# Patient Record
Sex: MALE | Race: WHITE | NOT HISPANIC OR LATINO | Employment: FULL TIME | URBAN - METROPOLITAN AREA
[De-identification: names, ages, dates, MRNs, and addresses within clinical notes are randomized per-mention and may not be internally consistent; named-entity substitution may affect disease eponyms.]

---

## 2023-02-19 PROBLEM — Z86.59 HISTORY OF OCD (OBSESSIVE COMPULSIVE DISORDER): Status: ACTIVE | Noted: 2023-02-19

## 2023-02-19 PROBLEM — L72.3 SEBACEOUS CYST: Status: ACTIVE | Noted: 2023-02-19

## 2023-02-19 PROBLEM — F32.A DEPRESSION: Status: ACTIVE | Noted: 2023-02-19

## 2023-02-19 PROBLEM — F41.9 ANXIETY: Status: ACTIVE | Noted: 2023-02-19

## 2023-02-19 PROBLEM — F31.81 BIPOLAR 2 DISORDER (MULTI): Status: ACTIVE | Noted: 2023-02-19

## 2023-02-19 RX ORDER — ESCITALOPRAM OXALATE 20 MG/1
TABLET ORAL
COMMUNITY
Start: 2021-04-15 | End: 2023-03-10 | Stop reason: ALTCHOICE

## 2023-02-19 RX ORDER — TURMERIC ROOT EXTRACT 500 MG
TABLET ORAL DAILY
COMMUNITY
Start: 2021-05-11

## 2023-02-19 RX ORDER — QUETIAPINE FUMARATE 400 MG/1
500 TABLET, FILM COATED ORAL NIGHTLY
COMMUNITY
Start: 2020-12-08

## 2023-02-19 RX ORDER — ASPIRIN 325 MG
50000 TABLET, DELAYED RELEASE (ENTERIC COATED) ORAL
COMMUNITY
Start: 2021-05-11

## 2023-03-09 PROBLEM — E55.9 VITAMIN D DEFICIENCY: Status: ACTIVE | Noted: 2023-03-09

## 2023-03-09 ASSESSMENT — ENCOUNTER SYMPTOMS
DIARRHEA: 0
CONSTIPATION: 0
FEVER: 0
EYE REDNESS: 0
ARTHRALGIAS: 0
ABDOMINAL PAIN: 0
MYALGIAS: 0
BRUISES/BLEEDS EASILY: 0
DYSURIA: 0
NERVOUS/ANXIOUS: 0
PALPITATIONS: 0
POLYDIPSIA: 0
DYSPHORIC MOOD: 0
WHEEZING: 0
FATIGUE: 0
CHILLS: 0
EYE DISCHARGE: 0
SORE THROAT: 0
DIZZINESS: 0
FREQUENCY: 0
HEADACHES: 0
COUGH: 0
VOMITING: 0
ADENOPATHY: 0
BLOOD IN STOOL: 0
RHINORRHEA: 0
NAUSEA: 0
HEMATURIA: 0
SHORTNESS OF BREATH: 0
POLYPHAGIA: 0

## 2023-03-10 ENCOUNTER — OFFICE VISIT (OUTPATIENT)
Dept: PRIMARY CARE | Facility: CLINIC | Age: 38
End: 2023-03-10
Payer: COMMERCIAL

## 2023-03-10 VITALS
HEART RATE: 70 BPM | HEIGHT: 72 IN | DIASTOLIC BLOOD PRESSURE: 72 MMHG | TEMPERATURE: 97.2 F | WEIGHT: 189 LBS | BODY MASS INDEX: 25.6 KG/M2 | SYSTOLIC BLOOD PRESSURE: 110 MMHG

## 2023-03-10 DIAGNOSIS — E55.9 VITAMIN D DEFICIENCY: ICD-10-CM

## 2023-03-10 DIAGNOSIS — Z00.00 ROUTINE GENERAL MEDICAL EXAMINATION AT A HEALTH CARE FACILITY: Primary | ICD-10-CM

## 2023-03-10 DIAGNOSIS — Z23 ENCOUNTER FOR VACCINATION: ICD-10-CM

## 2023-03-10 LAB
NON-UH HIE A/G RATIO: 1.4
NON-UH HIE ALB: 4 G/DL (ref 3.4–5)
NON-UH HIE ALK PHOS: 84 UNIT/L (ref 46–116)
NON-UH HIE BASO COUNT: 0.04 X1000 (ref 0–0.2)
NON-UH HIE BASOS %: 0.8 %
NON-UH HIE BILIRUBIN, TOTAL: 0.8 MG/DL (ref 0.2–1)
NON-UH HIE BUN/CREAT RATIO: 17
NON-UH HIE BUN: 17 MG/DL (ref 9–23)
NON-UH HIE CALCIUM: 9.6 MG/DL (ref 8.7–10.4)
NON-UH HIE CALCULATED LDL CHOLESTEROL: 164 MG/DL (ref 60–130)
NON-UH HIE CALCULATED OSMOLALITY: 277 MOSM/KG (ref 275–295)
NON-UH HIE CHLORIDE: 106 MMOL/L (ref 98–107)
NON-UH HIE CHOLESTEROL: 239 MG/DL (ref 100–200)
NON-UH HIE CO2, VENOUS: 30 MMOL/L (ref 20–31)
NON-UH HIE CREATININE: 1 MG/DL (ref 0.6–1.1)
NON-UH HIE DIFF?: NO
NON-UH HIE EOS COUNT: 0.1 X1000 (ref 0–0.5)
NON-UH HIE EOSIN %: 2.1 %
NON-UH HIE GFR AA: >60
NON-UH HIE GLOBULIN: 2.9 G/DL
NON-UH HIE GLOMERULAR FILTRATION RATE: >60 ML/MIN/1.73M?
NON-UH HIE GLUCOSE: 89 MG/DL (ref 74–106)
NON-UH HIE GOT: 28 UNIT/L (ref 15–37)
NON-UH HIE GPT: 59 UNIT/L (ref 10–49)
NON-UH HIE HCT: 47 % (ref 41–52)
NON-UH HIE HDL CHOLESTEROL: 42 MG/DL (ref 40–60)
NON-UH HIE HGB: 15.9 G/DL (ref 13.5–17.5)
NON-UH HIE INSTR WBC: 4.9
NON-UH HIE K: 4.5 MMOL/L (ref 3.5–5.1)
NON-UH HIE LYMPH %: 47.4 %
NON-UH HIE LYMPH COUNT: 2.34 X1000 (ref 1.2–4.8)
NON-UH HIE MCH: 30.4 PG (ref 27–34)
NON-UH HIE MCHC: 33.9 G/DL (ref 32–37)
NON-UH HIE MCV: 89.8 FL (ref 80–100)
NON-UH HIE MONO %: 6.9 %
NON-UH HIE MONO COUNT: 0.34 X1000 (ref 0.1–1)
NON-UH HIE MPV: 8 FL (ref 7.4–10.4)
NON-UH HIE NA: 138 MMOL/L (ref 135–145)
NON-UH HIE NEUTROPHIL %: 42.9 %
NON-UH HIE NEUTROPHIL COUNT (ANC): 2.11 X1000 (ref 1.4–8.8)
NON-UH HIE NUCLEATED RBC: 0 /100WBC
NON-UH HIE PLATELET: 247 X10 (ref 150–450)
NON-UH HIE RBC: 5.23 X10 (ref 4.7–6.1)
NON-UH HIE RDW: 12.6 % (ref 11.5–14.5)
NON-UH HIE TOTAL CHOL/HDL CHOL RATIO: 5.7
NON-UH HIE TOTAL PROTEIN: 6.9 G/DL (ref 5.7–8.2)
NON-UH HIE TRIGLYCERIDES: 167 MG/DL (ref 30–150)
NON-UH HIE TSH: 1.27 UIU/ML (ref 0.55–4.78)
NON-UH HIE VIT D 25: 50 NG/ML
NON-UH HIE WBC: 4.9 X10 (ref 4.5–11)

## 2023-03-10 PROCEDURE — 1036F TOBACCO NON-USER: CPT | Performed by: NURSE PRACTITIONER

## 2023-03-10 PROCEDURE — 90471 IMMUNIZATION ADMIN: CPT | Performed by: NURSE PRACTITIONER

## 2023-03-10 PROCEDURE — 90715 TDAP VACCINE 7 YRS/> IM: CPT | Performed by: NURSE PRACTITIONER

## 2023-03-10 PROCEDURE — 99395 PREV VISIT EST AGE 18-39: CPT | Performed by: NURSE PRACTITIONER

## 2023-03-10 PROCEDURE — 3008F BODY MASS INDEX DOCD: CPT | Performed by: NURSE PRACTITIONER

## 2023-03-10 RX ORDER — SERTRALINE HYDROCHLORIDE 50 MG/1
100 TABLET, FILM COATED ORAL DAILY
COMMUNITY
End: 2024-05-09 | Stop reason: WASHOUT

## 2023-03-10 ASSESSMENT — PATIENT HEALTH QUESTIONNAIRE - PHQ9
SUM OF ALL RESPONSES TO PHQ9 QUESTIONS 1 AND 2: 0
1. LITTLE INTEREST OR PLEASURE IN DOING THINGS: NOT AT ALL
2. FEELING DOWN, DEPRESSED OR HOPELESS: NOT AT ALL

## 2023-03-10 ASSESSMENT — ENCOUNTER SYMPTOMS
DEPRESSION: 0
LOSS OF SENSATION IN FEET: 0
OCCASIONAL FEELINGS OF UNSTEADINESS: 0

## 2023-03-10 NOTE — PATIENT INSTRUCTIONS
Form for work filled out    Handouts given to pt:  physical handout        I recommend signing up for MyChart.    Labs:    You can use the lab in our building when fasting. The hrs are: Monday-Thursday, 7 a.m. - 6 p.m., Friday, 7 a.m. - 5 p.m., Saturday 8 a.m. - 12 noon.   No appt needed, BUT YOU DO NEED THE PAPER ORDER.    Fasting is no food, drink, gum or mints other than water for 12 hrs.   Results will be back in 2-3 business days for most labs. It is always recommended for any orders (labs, xrays, ultrasounds,MRI, ct scan, procedures etc) to check with your insurance provider for expected costs or expenses to you.         You will get your results via phone from my medical assistant if you do not have MyChart.  OR  You will get your results via [employer]    If a result is urgent, I will call to speak to you.    Vaccines:  ---- Tdap-today      General recommendations:  Exercise-cardio 4-5d/wk 30min each day  Diet-Breakfast-toast (my favorite Christy Morgan Delighful Multigrain or Cody's Killer Bread Good Seed thin-sliced)/bagel/English muffin-whole wheat flour as a 1st ingredient or cereal/oatmeal/granola bar-fiber 4g or more or protein like eggs or peanut butter; optional veggies  Lunch-protein, 1/2c carb or 2 slices bread, veg 1c  Dinner-protein, fist sized carb, veg 1c  Fruit 2 a day  Dairy 2 a day-milk, soy milk, almond milk, cheese, yogurt, cottage cheese  Snacks-Protein-hard boiled egg, nuts (walnuts/almonds/pecans/pistachios 1/4c), hummus, beef/deer jerky or meat sticks; vegetable, fruit, dairy-milk(1%, skim, almond, soy)/cheese (not a lot of cheddar)/yogurt (Greek is best-my favorite Dannon Fruit on the Bottom Greek)/cottage cheese 2%; triscuits/ popcorn/wheat thins have a lot of fiber; follow serving size on bag/box/container  increase water  Limit alcohol to 1 drink per day for women and 2 drinks per day for men (1 drink=12oz beer or 5oz wine or 1 1/2oz liquor)  Calcium: 500mg 1 twice a day if age 50 and  younger and 600mg 1 twice a day if over age 50 (calcium citrate can be taken without food)  Vitamin D: 800-5000 IU/day  Limit salt to <2300mg a day if age 50 and under and <1500mg a day if over age 50/have high bp or diabetes or kidney disease  Recommend folate for childbearing age women 0.4mg per day (can be found in a multivitamin)  Recommend 18mg/dL of iron a day if age 50 and under and 8mg/dL a day if over age 50; take on an empty stomach at bedtime  Use sunscreen   Wear seatbelt  Recommend safe sex practices: using condoms everytime you have sex, discuss with a new partner about their past partners/history of STDs/drug use, avoid drinking alcohol or using drugs as this increases the chance that you will participate in high-risk sex, for oral sex help protect your mouth by having your partner use a condom (male or female), women should not douche after sex, be aware of your partner's body and your body-look for signs of a sore, blister, rash, or discharge, and have regular exams and periodic tests for STDs.  No distracted driving  No driving when under influence of substances  Wear a seatbelt  Eye dr every 1-2yrs  Dentist every 6-12 mon  Tetanus shot every 10yrs  Recommend flu vaccine in the fall  Appt in  1 year for physical      I will communicate with you via [email or web address] regarding messages and results. If you need help with this, you can call the support line at [phone].    IT WAS A PLEASURE TO SEE YOU TODAY. THANK YOU FOR CHOOSING US FOR YOUR HEALTHCARE NEEDS.

## 2023-03-10 NOTE — PROGRESS NOTES
Subjective   Patient ID: Amanuel Chan is a 37 y.o. male who presents for Annual Exam (Tdap today.  Fasting for blood work.  No other providers besides his psychiatrist.).  Is pt fasting? yes  Does pt see any providers other than care team below: none  Scott farnsworth-psychiatrist    Would pt like to update his tdap? Yes    Form for work    No care team member to display    HPI  Last labs-3/16/22 cbc nl, tsh nl, cmp nl, trigs 239, hdl 37, ldl 152  Due for labs- all    Cholesterol   Date Value Ref Range Status   03/16/2022 237 (H) 0 - 199 mg/dL Final     Comment:     .      AGE      DESIRABLE   BORDERLINE HIGH   HIGH     0-19 Y     0 - 169       170 - 199     >/= 200    20-24 Y     0 - 189       190 - 224     >/= 225         >24 Y     0 - 199       200 - 239     >/= 240   **All ranges are based on fasting samples. Specific   therapeutic targets will vary based on patient-specific   cardiac risk.  .   Pediatric guidelines reference:Pediatrics 2011, 128(S5).   Adult guidelines reference: NCEP ATPIII Guidelines,     JEANNETTE 2001, 258:2486-97  .   Venipuncture immediately after or during the    administration of Metamizole may lead to falsely   low results. Testing should be performed immediately   prior to Metamizole dosing.     05/11/2021 241 (H) 0 - 199 mg/dL Final     Comment:     .      AGE      DESIRABLE   BORDERLINE HIGH   HIGH     0-19 Y     0 - 169       170 - 199     >/= 200    20-24 Y     0 - 189       190 - 224     >/= 225         >24 Y     0 - 199       200 - 239     >/= 240   **All ranges are based on fasting samples. Specific   therapeutic targets will vary based on patient-specific   cardiac risk.  .   Pediatric guidelines reference:Pediatrics 2011, 128(S5).   Adult guidelines reference: NCEP ATPIII Guidelines,     JEANNETTE 2001, 258:2486-97  .   Venipuncture immediately after or during the    administration of Metamizole may lead to falsely   low results. Testing should be performed immediately   prior to  Metamizole dosing.       Triglycerides   Date Value Ref Range Status   03/16/2022 239 (H) 0 - 149 mg/dL Final     Comment:     .      AGE      DESIRABLE   BORDERLINE HIGH   HIGH     VERY HIGH   0 D-90 D    19 - 174         ----         ----        ----  91 D- 9 Y     0 -  74        75 -  99     >/= 100      ----    10-19 Y     0 -  89        90 - 129     >/= 130      ----    20-24 Y     0 - 114       115 - 149     >/= 150      ----         >24 Y     0 - 149       150 - 199    200- 499    >/= 500  .   Venipuncture immediately after or during the    administration of Metamizole may lead to falsely   low results. Testing should be performed immediately   prior to Metamizole dosing.     05/11/2021 265 (H) 0 - 149 mg/dL Final     Comment:     .      AGE      DESIRABLE   BORDERLINE HIGH   HIGH     VERY HIGH   0 D-90 D    19 - 174         ----         ----        ----  91 D- 9 Y     0 -  74        75 -  99     >/= 100      ----    10-19 Y     0 -  89        90 - 129     >/= 130      ----    20-24 Y     0 - 114       115 - 149     >/= 150      ----         >24 Y     0 - 149       150 - 199    200- 499    >/= 500  .   Venipuncture immediately after or during the    administration of Metamizole may lead to falsely   low results. Testing should be performed immediately   prior to Metamizole dosing.       HDL   Date Value Ref Range Status   03/16/2022 37.5 (A) mg/dL Final     Comment:     .      AGE      VERY LOW   LOW     NORMAL    HIGH       0-19 Y       < 35   < 40     40-45     ----    20-24 Y       ----   < 40       >45     ----      >24 Y       ----   < 40     40-60      >60  .     05/11/2021 37.5 (A) mg/dL Final     Comment:     .      AGE      VERY LOW   LOW     NORMAL    HIGH       0-19 Y       < 35   < 40     40-45     ----    20-24 Y       ----   < 40       >45     ----      >24 Y       ----   < 40     40-60      >60  .       No results found for: LDL  TSH   Date Value Ref Range Status   03/16/2022 2.41 0.44 - 3.98  mIU/L Final     Comment:      TSH testing is performed using different testing    methodology at [facility] than at other    [facility] hospitals. Direct result comparisons should    only be made within the same method.       No components found for: A1C  No components found for: POCA1C  No results found for: ALBUR  No components found for: POCALBUR      Other concerns: none    bps at home- none    ER/urgicare visits in the last year- none  Hospitalizations in the last year- none      FH colon ca-none  FH prostate ca-mgf      Exercise- conductor-work, run, lift wts  Diet-3 meals a day   Body mass index is 25.63 kg/m².        last dental appt- fall 2022    BMs-regular  Sleep-able to fall asleep and stay asleep; no snoring or apnea  no cp, swelling, sob, abd pain, n/v/d/c, blood in stool or black stools  STI testing including hiv (age 15-65) and hep c screening (18-79)-declines        Immunization History   Administered Date(s) Administered    Influenza, seasonal, injectable 10/06/2022       fractures in lifetime-none      FH heart attack, heart surgery-pgf pacemaker, mi  FH stroke-dad, mgm    The ASCVD Risk score (Susanne DK, et al., 2019) failed to calculate for the following reasons:    The 2019 ASCVD risk score is only valid for ages 40 to 79        Review of Systems   Constitutional:  Negative for chills, fatigue and fever.   HENT:  Negative for congestion, ear pain, postnasal drip, rhinorrhea and sore throat.    Eyes:  Negative for discharge, redness and visual disturbance.   Respiratory:  Negative for cough, shortness of breath and wheezing.    Cardiovascular:  Negative for chest pain, palpitations and leg swelling.   Gastrointestinal:  Negative for abdominal pain, blood in stool, constipation, diarrhea, nausea and vomiting.   Endocrine: Negative for cold intolerance, polydipsia, polyphagia and polyuria.   Genitourinary:  Negative for dysuria, frequency, genital sores, hematuria, penile pain,  testicular pain and urgency.   Musculoskeletal:  Negative for arthralgias and myalgias.   Skin:  Negative for rash.   Neurological:  Negative for dizziness, syncope and headaches.   Hematological:  Negative for adenopathy. Does not bruise/bleed easily.   Psychiatric/Behavioral:  Negative for dysphoric mood. The patient is not nervous/anxious.        Objective   Visit Vitals  /72   Pulse 70   Temp 36.2 °C (97.2 °F)      BP Readings from Last 3 Encounters:   03/10/23 110/72   03/16/22 116/74   05/11/21 114/68     Wt Readings from Last 3 Encounters:   03/10/23 85.7 kg (189 lb)   03/16/22 86 kg (189 lb 8 oz)   05/11/21 86.2 kg (190 lb)           Physical Exam  Vitals reviewed.   Constitutional:       General: He is not in acute distress.     Appearance: Normal appearance. He is not ill-appearing.   HENT:      Head: Normocephalic.      Right Ear: Tympanic membrane, ear canal and external ear normal.      Left Ear: Tympanic membrane, ear canal and external ear normal.      Nose: Nose normal.      Mouth/Throat:      Mouth: Mucous membranes are moist.      Pharynx: Oropharynx is clear. No oropharyngeal exudate or posterior oropharyngeal erythema.   Eyes:      Extraocular Movements: Extraocular movements intact.      Conjunctiva/sclera: Conjunctivae normal.      Pupils: Pupils are equal, round, and reactive to light.   Cardiovascular:      Rate and Rhythm: Normal rate and regular rhythm.      Heart sounds: Normal heart sounds. No murmur heard.  Pulmonary:      Effort: Pulmonary effort is normal. No respiratory distress.      Breath sounds: Normal breath sounds. No wheezing, rhonchi or rales.   Abdominal:      General: Bowel sounds are normal. There is no distension.      Palpations: Abdomen is soft. There is no mass.      Tenderness: There is no abdominal tenderness.   Musculoskeletal:         General: No swelling or tenderness. Normal range of motion.      Cervical back: Normal range of motion and neck supple. No  tenderness.      Right lower leg: No edema.      Left lower leg: No edema.   Lymphadenopathy:      Cervical: No cervical adenopathy.   Skin:     General: Skin is warm.      Findings: No rash.   Neurological:      General: No focal deficit present.      Mental Status: He is alert and oriented to person, place, and time.      Cranial Nerves: No cranial nerve deficit.   Psychiatric:         Mood and Affect: Mood normal.         Behavior: Behavior normal.     Assessment/Plan       1. Routine general medical examination at a health care facility    - Comprehensive Metabolic Panel; Future  - CBC and Auto Differential; Future  - Lipid Panel; Future  - TSH with reflex to Free T4 if abnormal; Future  - Comprehensive Metabolic Panel  - CBC and Auto Differential  - Lipid Panel  - TSH with reflex to Free T4 if abnormal    2. Vitamin D deficiency    - Vitamin D 25-Hydroxy,Total; Future  - Vitamin D 25-Hydroxy,Total    3. Encounter for vaccination      4. BMI 25.0-25.9,adult

## 2023-03-12 DIAGNOSIS — R74.8 ELEVATED LIVER ENZYMES: Primary | ICD-10-CM

## 2023-03-12 PROBLEM — E78.00 PURE HYPERCHOLESTEROLEMIA: Status: ACTIVE | Noted: 2023-03-12

## 2023-03-16 ENCOUNTER — OFFICE VISIT (OUTPATIENT)
Dept: PRIMARY CARE | Facility: CLINIC | Age: 38
End: 2023-03-16
Payer: COMMERCIAL

## 2023-03-16 VITALS
HEIGHT: 72 IN | TEMPERATURE: 97.9 F | WEIGHT: 192 LBS | HEART RATE: 80 BPM | BODY MASS INDEX: 26.01 KG/M2 | SYSTOLIC BLOOD PRESSURE: 124 MMHG | DIASTOLIC BLOOD PRESSURE: 72 MMHG

## 2023-03-16 DIAGNOSIS — E78.00 PURE HYPERCHOLESTEROLEMIA: Primary | ICD-10-CM

## 2023-03-16 PROCEDURE — 1036F TOBACCO NON-USER: CPT | Performed by: NURSE PRACTITIONER

## 2023-03-16 PROCEDURE — 99213 OFFICE O/P EST LOW 20 MIN: CPT | Performed by: NURSE PRACTITIONER

## 2023-03-16 PROCEDURE — 3008F BODY MASS INDEX DOCD: CPT | Performed by: NURSE PRACTITIONER

## 2023-03-16 ASSESSMENT — ENCOUNTER SYMPTOMS
SHORTNESS OF BREATH: 0
WHEEZING: 0
CONSTITUTIONAL NEGATIVE: 1

## 2023-03-16 NOTE — PATIENT INSTRUCTIONS
I will contact Dr farnsworth in [place] and a cardio dr to see if seroquel causes the incr risk of cardiovascular dis or is it the elevating of the ldl that causes it.    Goal LDL <100  Goal trigs <150  Goal HDL >50  Exercise-cardio 4-5d/wk 30min each day  Diet-Breakfast-toast (my favorite Christy Morgan Delightful multi-grain and Cody's Killer Bread thin-sliced Good Seed)/bagel/English muffin-whole wheat flour as a 1st ingredient or cereal/oatmeal/granola bar-fiber 4g or more; protein like eggs or peanut butter is Ok  Lunch-protein, veg 1c  Dinner-protein, veg 1c; if having potatoes, rice, noodles, or pasta-->fist sized; could try brown rice or whole wheat pasta or quinoa  Fruit 2 a day  Dairy 2 a day-milk, almond milk, soy milk, yogurt, cottage cheese, yogurt  Snacks-Protein-hard boiled egg, nuts (walnuts/almonds/pecans/pistachios 1/4c), hummus, beef/deer jerky; vegetable, fruit, dairy-milk(1%, skim, almond, soy)/cheese (not a lot of cheddar)/yogurt (Greek is best-my favorite Dannon Fruit on the Bottom Greek)/cottage cheese 2%; triscuits, popcorn have a lot of fiber; serving size  Water  Limit alcohol to 2 drinks per day (1 drink=12oz beer or 5oz wine or 1 1/2oz liquor)  appt in    months; be fasting      I will communicate with you via [employer] regarding messages and results. If you need help with this, you can call the support line at [phone].    IT WAS A PLEASURE TO SEE YOU TODAY. THANK YOU FOR CHOOSING US FOR YOUR HEALTHCARE NEEDS.

## 2023-03-16 NOTE — PROGRESS NOTES
Subjective   Patient ID: Amanuel Chan is a 37 y.o. male who presents for No chief complaint on file..      HPI  Last labs-3/2023 vit d nl, tsh nl, cbc nl, 1 liver enz high at 59 (nl up to 49), sugar nl at 89, trigs 167, ldl 164      Cholesterol   Date Value Ref Range Status   03/16/2022 237 (H) 0 - 199 mg/dL Final     Comment:     .      AGE      DESIRABLE   BORDERLINE HIGH   HIGH     0-19 Y     0 - 169       170 - 199     >/= 200    20-24 Y     0 - 189       190 - 224     >/= 225         >24 Y     0 - 199       200 - 239     >/= 240   **All ranges are based on fasting samples. Specific   therapeutic targets will vary based on patient-specific   cardiac risk.  .   Pediatric guidelines reference:Pediatrics 2011, 128(S5).   Adult guidelines reference: NCEP ATPIII Guidelines,     JEANNETTE 2001, 258:2486-97  .   Venipuncture immediately after or during the    administration of Metamizole may lead to falsely   low results. Testing should be performed immediately   prior to Metamizole dosing.     05/11/2021 241 (H) 0 - 199 mg/dL Final     Comment:     .      AGE      DESIRABLE   BORDERLINE HIGH   HIGH     0-19 Y     0 - 169       170 - 199     >/= 200    20-24 Y     0 - 189       190 - 224     >/= 225         >24 Y     0 - 199       200 - 239     >/= 240   **All ranges are based on fasting samples. Specific   therapeutic targets will vary based on patient-specific   cardiac risk.  .   Pediatric guidelines reference:Pediatrics 2011, 128(S5).   Adult guidelines reference: NCEP ATPIII Guidelines,     JEANNETTE 2001, 258:2486-97  .   Venipuncture immediately after or during the    administration of Metamizole may lead to falsely   low results. Testing should be performed immediately   prior to Metamizole dosing.       Triglycerides   Date Value Ref Range Status   03/16/2022 239 (H) 0 - 149 mg/dL Final     Comment:     .      AGE      DESIRABLE   BORDERLINE HIGH   HIGH     VERY HIGH   0 D-90 D    19 - 174         ----         ----         ----  91 D- 9 Y     0 -  74        75 -  99     >/= 100      ----    10-19 Y     0 -  89        90 - 129     >/= 130      ----    20-24 Y     0 - 114       115 - 149     >/= 150      ----         >24 Y     0 - 149       150 - 199    200- 499    >/= 500  .   Venipuncture immediately after or during the    administration of Metamizole may lead to falsely   low results. Testing should be performed immediately   prior to Metamizole dosing.     05/11/2021 265 (H) 0 - 149 mg/dL Final     Comment:     .      AGE      DESIRABLE   BORDERLINE HIGH   HIGH     VERY HIGH   0 D-90 D    19 - 174         ----         ----        ----  91 D- 9 Y     0 -  74        75 -  99     >/= 100      ----    10-19 Y     0 -  89        90 - 129     >/= 130      ----    20-24 Y     0 - 114       115 - 149     >/= 150      ----         >24 Y     0 - 149       150 - 199    200- 499    >/= 500  .   Venipuncture immediately after or during the    administration of Metamizole may lead to falsely   low results. Testing should be performed immediately   prior to Metamizole dosing.       HDL   Date Value Ref Range Status   03/16/2022 37.5 (A) mg/dL Final     Comment:     .      AGE      VERY LOW   LOW     NORMAL    HIGH       0-19 Y       < 35   < 40     40-45     ----    20-24 Y       ----   < 40       >45     ----      >24 Y       ----   < 40     40-60      >60  .     05/11/2021 37.5 (A) mg/dL Final     Comment:     .      AGE      VERY LOW   LOW     NORMAL    HIGH       0-19 Y       < 35   < 40     40-45     ----    20-24 Y       ----   < 40       >45     ----      >24 Y       ----   < 40     40-60      >60  .       No results found for: LDL  TSH   Date Value Ref Range Status   03/16/2022 2.41 0.44 - 3.98 mIU/L Final     Comment:      TSH testing is performed using different testing    methodology at [facility] than at other    [facility]. Direct result comparisons should    only be made within the same method.       No  components found for: A1C  No components found for: POCA1C  No results found for: ALBUR  No components found for: POCALBUR    Exercise-run, lift wts, conduct orchestra  Diet-breakfast-eggs and toast, granola and yogurt-kefir and berries, coffee with creamer  Lunch-rotisserie ch or healthy choice/lean cuisine; fruit, carrots, water, salad  Dinner-protein, veg, water  Snacks-protein bar  Etoh-rare    Fh chol-unsure  MI/pacemaker- pgf  Dad, mgf stroke    Immunization History   Administered Date(s) Administered    Influenza, seasonal, injectable 10/06/2022    Tdap 03/10/2023        No care team member to display     Review of Systems   Constitutional: Negative.    Respiratory:  Negative for shortness of breath and wheezing.    Cardiovascular:  Negative for chest pain.       Objective   There were no vitals taken for this visit.   BP Readings from Last 3 Encounters:   03/10/23 110/72   03/16/22 116/74   05/11/21 114/68     Wt Readings from Last 3 Encounters:   03/10/23 85.7 kg (189 lb)   03/16/22 86 kg (189 lb 8 oz)   05/11/21 86.2 kg (190 lb)       The ASCVD Risk score (Salcha DK, et al., 2019) failed to calculate for the following reasons:    The 2019 ASCVD risk score is only valid for ages 40 to 79     Physical Exam  Constitutional:       General: He is not in acute distress.     Appearance: Normal appearance.   Neurological:      Mental Status: He is alert.         Assessment/Plan   Diagnoses and all orders for this visit:  Pure hypercholesterolemia

## 2023-03-20 ENCOUNTER — PATIENT MESSAGE (OUTPATIENT)
Dept: PRIMARY CARE | Facility: CLINIC | Age: 38
End: 2023-03-20
Payer: COMMERCIAL

## 2023-03-20 DIAGNOSIS — E78.00 PURE HYPERCHOLESTEROLEMIA: Primary | ICD-10-CM

## 2023-04-10 LAB
NON-UH HIE ALB: 3.9 G/DL (ref 3.4–5)
NON-UH HIE ALK PHOS: 89 UNIT/L (ref 46–116)
NON-UH HIE BILIRUBIN, DIRECT: 0.1 MG/DL (ref 0–0.4)
NON-UH HIE BILIRUBIN, TOTAL: 0.4 MG/DL (ref 0.2–1)
NON-UH HIE GOT: 23 UNIT/L (ref 15–37)
NON-UH HIE GPT: 41 UNIT/L (ref 10–49)
NON-UH HIE TOTAL PROTEIN: 6.8 G/DL (ref 5.7–8.2)

## 2023-04-13 LAB — NON-UH HIE MISC SENDOUT: NORMAL

## 2023-04-15 ENCOUNTER — TELEMEDICINE (OUTPATIENT)
Dept: PRIMARY CARE | Facility: CLINIC | Age: 38
End: 2023-04-15
Payer: COMMERCIAL

## 2023-04-15 DIAGNOSIS — J01.20 SUBACUTE ETHMOIDAL SINUSITIS: Primary | ICD-10-CM

## 2023-04-15 PROCEDURE — 99213 OFFICE O/P EST LOW 20 MIN: CPT | Performed by: STUDENT IN AN ORGANIZED HEALTH CARE EDUCATION/TRAINING PROGRAM

## 2023-04-15 RX ORDER — AMOXICILLIN AND CLAVULANATE POTASSIUM 875; 125 MG/1; MG/1
1 TABLET, FILM COATED ORAL 2 TIMES DAILY
Qty: 20 TABLET | Refills: 0 | Status: SHIPPED | OUTPATIENT
Start: 2023-04-15 | End: 2023-04-25

## 2023-04-15 ASSESSMENT — LIFESTYLE VARIABLES: HISTORY_OF_SMOKING: I SMOKED IN THE PAST, BUT QUIT

## 2023-04-15 ASSESSMENT — ENCOUNTER SYMPTOMS
SHORTNESS OF BREATH: 0
TROUBLE SWALLOWING: 0
SINUS PRESSURE: 1
SINUS PAIN: 1
FEVER: 0

## 2023-04-15 NOTE — PROGRESS NOTES
Patient Name:  Amanuel Chan  MRN:  [id number]  :  1985    Subjective   Patient ID: Amanuel Chan is a 38 y.o. male who presents for No chief complaint on file..    HPI     37 yo male presents with sinus symptoms    Symptoms for 2-3 weeks  Congestion, sinus headaches, post nasal drip  No sick contacts, covid contacts, fevers    Review of Systems   Constitutional:  Negative for fever.   HENT:  Positive for congestion, postnasal drip, sinus pressure and sinus pain. Negative for trouble swallowing.    Respiratory:  Negative for shortness of breath.    Cardiovascular:  Negative for chest pain.   Allergic/Immunologic: Negative for immunocompromised state.     Objective   There were no vitals taken for this visit.    Physical Exam  Constitutional:       Appearance: Normal appearance.   HENT:      Head: Normocephalic and atraumatic.      Comments: Sinus pain and pressure with palpation     Nose: Congestion present.   Eyes:      Extraocular Movements: Extraocular movements intact.   Pulmonary:      Effort: Pulmonary effort is normal.   Neurological:      Mental Status: He is alert and oriented to person, place, and time.         Assessment/Plan   1. Subacute ethmoidal sinusitis  amoxicillin-pot clavulanate (Augmentin) 875-125 mg tablet        Patient has exhibited symptoms for more than 7 days with worsened features and symptoms including one of the clinical practice guidelines to indicate bacterial sinusitis (purulent nasal drainage, unilateral nasal obstruction, facial pain/pressure, and/or anosmia).In patients with rhinosinusitis, antibiotic therapy is recommended if the above criteria are met.   Patient is to be treated with: augmentin  Patient is advised on recommended duration of treatment, allergies are checked with the patient to assure no hx of reaction, patient advised on possible side effects of this medication.   Patient may also use symptomatic relief for this condition including nasal saline  irrigation, intranasal corticosteroids.

## 2023-04-27 ENCOUNTER — PATIENT MESSAGE (OUTPATIENT)
Dept: PRIMARY CARE | Facility: CLINIC | Age: 38
End: 2023-04-27
Payer: COMMERCIAL

## 2023-04-27 DIAGNOSIS — E78.00 PURE HYPERCHOLESTEROLEMIA: Primary | ICD-10-CM

## 2023-04-27 RX ORDER — ROSUVASTATIN CALCIUM 10 MG/1
10 TABLET, COATED ORAL DAILY
Qty: 30 TABLET | Refills: 1 | Status: SHIPPED | OUTPATIENT
Start: 2023-04-27 | End: 2023-06-26

## 2023-05-31 LAB
NON-UH HIE ALB: 3.8 G/DL (ref 3.4–5)
NON-UH HIE ALK PHOS: 81 UNIT/L (ref 46–116)
NON-UH HIE BILIRUBIN, DIRECT: 0.2 MG/DL (ref 0–0.4)
NON-UH HIE BILIRUBIN, TOTAL: 0.7 MG/DL (ref 0.2–1)
NON-UH HIE CALCULATED LDL CHOLESTEROL: 78 MG/DL (ref 60–130)
NON-UH HIE CHOLESTEROL: 153 MG/DL (ref 100–200)
NON-UH HIE GOT: 21 UNIT/L (ref 15–37)
NON-UH HIE GPT: 40 UNIT/L (ref 10–49)
NON-UH HIE HDL CHOLESTEROL: 38 MG/DL (ref 40–60)
NON-UH HIE TOTAL CHOL/HDL CHOL RATIO: 4
NON-UH HIE TOTAL PROTEIN: 6.6 G/DL (ref 5.7–8.2)
NON-UH HIE TRIGLYCERIDES: 185 MG/DL (ref 30–150)

## 2023-06-26 DIAGNOSIS — E78.00 PURE HYPERCHOLESTEROLEMIA: ICD-10-CM

## 2023-06-26 RX ORDER — ROSUVASTATIN CALCIUM 10 MG/1
TABLET, COATED ORAL
Qty: 90 TABLET | Refills: 0 | Status: SHIPPED | OUTPATIENT
Start: 2023-06-26 | End: 2023-09-21

## 2023-09-21 DIAGNOSIS — E78.00 PURE HYPERCHOLESTEROLEMIA: ICD-10-CM

## 2023-09-21 RX ORDER — ROSUVASTATIN CALCIUM 10 MG/1
TABLET, COATED ORAL
Qty: 90 TABLET | Refills: 0 | Status: SHIPPED | OUTPATIENT
Start: 2023-09-21 | End: 2023-12-19

## 2023-12-17 DIAGNOSIS — E78.00 PURE HYPERCHOLESTEROLEMIA: ICD-10-CM

## 2023-12-19 ENCOUNTER — TELEPHONE (OUTPATIENT)
Dept: PRIMARY CARE | Facility: CLINIC | Age: 38
End: 2023-12-19
Payer: COMMERCIAL

## 2023-12-19 RX ORDER — ROSUVASTATIN CALCIUM 10 MG/1
TABLET, COATED ORAL
Qty: 90 TABLET | Refills: 0 | Status: SHIPPED | OUTPATIENT
Start: 2023-12-19 | End: 2024-01-12

## 2024-01-12 DIAGNOSIS — E78.00 PURE HYPERCHOLESTEROLEMIA: ICD-10-CM

## 2024-01-12 RX ORDER — ROSUVASTATIN CALCIUM 10 MG/1
TABLET, COATED ORAL
Qty: 90 TABLET | Refills: 2 | Status: SHIPPED | OUTPATIENT
Start: 2024-01-12 | End: 2024-03-12 | Stop reason: SDUPTHER

## 2024-03-12 ENCOUNTER — OFFICE VISIT (OUTPATIENT)
Dept: PRIMARY CARE | Facility: CLINIC | Age: 39
End: 2024-03-12
Payer: COMMERCIAL

## 2024-03-12 ENCOUNTER — LAB (OUTPATIENT)
Dept: LAB | Facility: LAB | Age: 39
End: 2024-03-12
Payer: COMMERCIAL

## 2024-03-12 VITALS
HEART RATE: 71 BPM | DIASTOLIC BLOOD PRESSURE: 71 MMHG | SYSTOLIC BLOOD PRESSURE: 107 MMHG | OXYGEN SATURATION: 93 % | BODY MASS INDEX: 25.6 KG/M2 | HEIGHT: 72 IN | TEMPERATURE: 97.7 F | WEIGHT: 189 LBS

## 2024-03-12 DIAGNOSIS — Z00.00 ROUTINE GENERAL MEDICAL EXAMINATION AT A HEALTH CARE FACILITY: Primary | ICD-10-CM

## 2024-03-12 DIAGNOSIS — E78.00 PURE HYPERCHOLESTEROLEMIA: ICD-10-CM

## 2024-03-12 DIAGNOSIS — E55.9 VITAMIN D DEFICIENCY: ICD-10-CM

## 2024-03-12 DIAGNOSIS — R39.198 DIFFICULTY URINATING: ICD-10-CM

## 2024-03-12 DIAGNOSIS — Z00.00 ROUTINE GENERAL MEDICAL EXAMINATION AT A HEALTH CARE FACILITY: ICD-10-CM

## 2024-03-12 LAB
25(OH)D3 SERPL-MCNC: 59 NG/ML (ref 30–100)
ALBUMIN SERPL BCP-MCNC: 4.5 G/DL (ref 3.4–5)
ALP SERPL-CCNC: 67 U/L (ref 33–120)
ALT SERPL W P-5'-P-CCNC: 32 U/L (ref 10–52)
ANION GAP SERPL CALC-SCNC: 11 MMOL/L (ref 10–20)
AST SERPL W P-5'-P-CCNC: 16 U/L (ref 9–39)
BASOPHILS # BLD AUTO: 0.04 X10*3/UL (ref 0–0.1)
BASOPHILS NFR BLD AUTO: 0.7 %
BILIRUB SERPL-MCNC: 0.7 MG/DL (ref 0–1.2)
BUN SERPL-MCNC: 20 MG/DL (ref 6–23)
CALCIUM SERPL-MCNC: 9.5 MG/DL (ref 8.6–10.6)
CHLORIDE SERPL-SCNC: 107 MMOL/L (ref 98–107)
CHOLEST SERPL-MCNC: 161 MG/DL (ref 0–199)
CHOLESTEROL/HDL RATIO: 4.1
CO2 SERPL-SCNC: 26 MMOL/L (ref 21–32)
CREAT SERPL-MCNC: 0.92 MG/DL (ref 0.5–1.3)
EGFRCR SERPLBLD CKD-EPI 2021: >90 ML/MIN/1.73M*2
EOSINOPHIL # BLD AUTO: 0.14 X10*3/UL (ref 0–0.7)
EOSINOPHIL NFR BLD AUTO: 2.6 %
ERYTHROCYTE [DISTWIDTH] IN BLOOD BY AUTOMATED COUNT: 12.8 % (ref 11.5–14.5)
GLUCOSE SERPL-MCNC: 86 MG/DL (ref 74–99)
HCT VFR BLD AUTO: 45.5 % (ref 41–52)
HDLC SERPL-MCNC: 39.7 MG/DL
HGB BLD-MCNC: 15.6 G/DL (ref 13.5–17.5)
IMM GRANULOCYTES # BLD AUTO: 0 X10*3/UL (ref 0–0.7)
IMM GRANULOCYTES NFR BLD AUTO: 0 % (ref 0–0.9)
LDLC SERPL CALC-MCNC: 90 MG/DL
LYMPHOCYTES # BLD AUTO: 2.48 X10*3/UL (ref 1.2–4.8)
LYMPHOCYTES NFR BLD AUTO: 45.7 %
MCH RBC QN AUTO: 30.2 PG (ref 26–34)
MCHC RBC AUTO-ENTMCNC: 34.3 G/DL (ref 32–36)
MCV RBC AUTO: 88 FL (ref 80–100)
MONOCYTES # BLD AUTO: 0.33 X10*3/UL (ref 0.1–1)
MONOCYTES NFR BLD AUTO: 6.1 %
NEUTROPHILS # BLD AUTO: 2.44 X10*3/UL (ref 1.2–7.7)
NEUTROPHILS NFR BLD AUTO: 44.9 %
NON HDL CHOLESTEROL: 121 MG/DL (ref 0–149)
NRBC BLD-RTO: 0 /100 WBCS (ref 0–0)
PLATELET # BLD AUTO: 276 X10*3/UL (ref 150–450)
POC APPEARANCE, URINE: CLEAR
POC BILIRUBIN, URINE: NEGATIVE
POC BLOOD, URINE: NEGATIVE
POC COLOR, URINE: YELLOW
POC GLUCOSE, URINE: NEGATIVE MG/DL
POC KETONES, URINE: NEGATIVE MG/DL
POC LEUKOCYTES, URINE: NEGATIVE
POC NITRITE,URINE: NEGATIVE
POC PH, URINE: 6 PH
POC PROTEIN, URINE: ABNORMAL MG/DL
POC SPECIFIC GRAVITY, URINE: >=1.03
POC UROBILINOGEN, URINE: 0.2 EU/DL
POTASSIUM SERPL-SCNC: 4.2 MMOL/L (ref 3.5–5.3)
PROT SERPL-MCNC: 6.8 G/DL (ref 6.4–8.2)
PSA SERPL-MCNC: 3.06 NG/ML
RBC # BLD AUTO: 5.17 X10*6/UL (ref 4.5–5.9)
SODIUM SERPL-SCNC: 140 MMOL/L (ref 136–145)
TRIGL SERPL-MCNC: 156 MG/DL (ref 0–149)
TSH SERPL-ACNC: 3.24 MIU/L (ref 0.44–3.98)
VLDL: 31 MG/DL (ref 0–40)
WBC # BLD AUTO: 5.4 X10*3/UL (ref 4.4–11.3)

## 2024-03-12 PROCEDURE — 85025 COMPLETE CBC W/AUTO DIFF WBC: CPT

## 2024-03-12 PROCEDURE — 87086 URINE CULTURE/COLONY COUNT: CPT

## 2024-03-12 PROCEDURE — 3008F BODY MASS INDEX DOCD: CPT | Performed by: NURSE PRACTITIONER

## 2024-03-12 PROCEDURE — 80053 COMPREHEN METABOLIC PANEL: CPT

## 2024-03-12 PROCEDURE — 82306 VITAMIN D 25 HYDROXY: CPT

## 2024-03-12 PROCEDURE — 81003 URINALYSIS AUTO W/O SCOPE: CPT | Performed by: NURSE PRACTITIONER

## 2024-03-12 PROCEDURE — 84153 ASSAY OF PSA TOTAL: CPT

## 2024-03-12 PROCEDURE — 36415 COLL VENOUS BLD VENIPUNCTURE: CPT

## 2024-03-12 PROCEDURE — 80061 LIPID PANEL: CPT

## 2024-03-12 PROCEDURE — 84443 ASSAY THYROID STIM HORMONE: CPT

## 2024-03-12 PROCEDURE — 1036F TOBACCO NON-USER: CPT | Performed by: NURSE PRACTITIONER

## 2024-03-12 PROCEDURE — 99396 PREV VISIT EST AGE 40-64: CPT | Performed by: NURSE PRACTITIONER

## 2024-03-12 RX ORDER — ROSUVASTATIN CALCIUM 10 MG/1
TABLET, COATED ORAL
Qty: 90 TABLET | Refills: 4 | Status: SHIPPED | OUTPATIENT
Start: 2024-03-12

## 2024-03-12 ASSESSMENT — ENCOUNTER SYMPTOMS
ADENOPATHY: 0
POLYDIPSIA: 0
FATIGUE: 0
DIARRHEA: 0
PALPITATIONS: 0
VOMITING: 0
SHORTNESS OF BREATH: 0
DYSURIA: 0
CHILLS: 0
POLYPHAGIA: 0
CONSTIPATION: 0
BRUISES/BLEEDS EASILY: 0
MYALGIAS: 0
ABDOMINAL PAIN: 0
WHEEZING: 0
DYSPHORIC MOOD: 0
SORE THROAT: 0
EYE REDNESS: 0
RHINORRHEA: 0
HEMATURIA: 0
EYE DISCHARGE: 0
HEADACHES: 0
FEVER: 0
COUGH: 0
ARTHRALGIAS: 0
NERVOUS/ANXIOUS: 0
FREQUENCY: 0
BLOOD IN STOOL: 0
DIZZINESS: 0
NAUSEA: 0

## 2024-03-12 ASSESSMENT — PROMIS GLOBAL HEALTH SCALE
CARRYOUT_PHYSICAL_ACTIVITIES: COMPLETELY
EMOTIONAL_PROBLEMS: RARELY
RATE_MENTAL_HEALTH: VERY GOOD
RATE_QUALITY_OF_LIFE: VERY GOOD
RATE_SOCIAL_SATISFACTION: VERY GOOD
RATE_PHYSICAL_HEALTH: VERY GOOD
RATE_GENERAL_HEALTH: VERY GOOD
CARRYOUT_SOCIAL_ACTIVITIES: VERY GOOD
RATE_AVERAGE_FATIGUE: MODERATE
RATE_AVERAGE_PAIN: 1

## 2024-03-12 NOTE — PATIENT INSTRUCTIONS
See urologist    Med refilled. The number of refills on the meds match when you need to return to the office for an appt. I recommend making your next appt today so you don't run out of your medication as it may take me up to 3 days to refill it.    Handouts given to pt:  physical handout        Labs:    You can use the lab in our building when fasting. The hrs are: Monday-Friday, 7 a.m. - 5 p.m., Saturday 8 a.m. - 12 noon.   No appt needed, BUT YOU DO NEED THE PAPER ORDER.    Fasting is no food, drink, gum or mints other than water for 12 hrs.   Results will be back in 2-3 business days for most labs. It is always recommended for any orders (labs, xrays, ultrasounds,MRI, ct scan, procedures etc) to check with your insurance provider for expected costs or expenses to you.         You will get your results via phone from my medical assistant if you do not have MyChart.  OR  You will get your results via [employer]    If a result is urgent, I will call to speak to you.    Vaccines:  Up to date    General recommendations:  Exercise-cardio 4-5d/wk 30min each day  Diet-Breakfast-toast (my favorite Christy Morgan Delighful Multigrain or [employer] Killer Bread Good Seed thin-sliced)/bagel/English muffin-whole wheat flour as a 1st ingredient or cereal/oatmeal/granola bar-fiber 4g or more or protein like eggs or peanut butter; optional veggies  Lunch-protein, 1/2c carb or 2 slices bread, veg 1c  Dinner-protein, fist sized carb, veg 1c  Fruit 2 a day  Dairy 2 a day-milk, soy milk, almond milk, cheese, yogurt, cottage cheese  Snacks-Protein-hard boiled egg, nuts (walnuts/almonds/pecans/pistachios 1/4c), hummus, beef/deer jerky or meat sticks; vegetable, fruit, dairy-milk(1%, skim, almond, soy)/cheese (not a lot of cheddar)/yogurt (Greek is best-my favorite Dannon Fruit on the Bottom Greek)/cottage cheese 2%; triscuits/ popcorn/wheat thins have a lot of fiber; follow serving size on bag/box/container  increase water  Limit alcohol to 1  drink per day for women and 2 drinks per day for men (1 drink=12oz beer or 5oz wine or 1 1/2oz liquor)  Calcium: 500mg 1 twice a day if age 50 and younger and 600mg 1 twice a day if over age 50 (calcium citrate can be taken without food)  Vitamin D: 800-5000 IU/day  Limit salt to <2300mg a day if age 50 and under and <1500mg a day if over age 50/have high bp or diabetes or kidney disease  Recommend folate for childbearing age women 0.4mg per day (can be found in a multivitamin)  Recommend 18mg/dL of iron a day if age 50 and under and 8mg/dL a day if over age 50; take on an empty stomach at bedtime  Use sunscreen   Wear seatbelt  Recommend safe sex practices: using condoms everytime you have sex, discuss with a new partner about their past partners/history of STDs/drug use, avoid drinking alcohol or using drugs as this increases the chance that you will participate in high-risk sex, for oral sex help protect your mouth by having your partner use a condom (male or female), women should not douche after sex, be aware of your partner's body and your body-look for signs of a sore, blister, rash, or discharge, and have regular exams and periodic tests for STDs.  No distracted driving  No driving when under influence of substances  Wear a seatbelt  Eye dr every 1-2yrs  Dentist every 6-12 mon  Tetanus shot every 10yrs  Recommend flu vaccine in the fall  Appt in  1 year for physical      I will communicate with you via [employer] regarding messages and results. If you need help with this, you can call the support line at [phone].    IT WAS A PLEASURE TO SEE YOU TODAY. THANK YOU FOR CHOOSING US FOR YOUR HEALTHCARE NEEDS.

## 2024-03-12 NOTE — PROGRESS NOTES
Subjective   Patient ID: Amanuel Chan is a 38 y.o. male who presents for Annual Exam.      Is pt fasting? Yes   Does pt see any providers other than care team below:   Isaiah    Any forms to fill out? Yes   Any other questions or concerns that pt wants to discuss today? Prostate check         No care team member to display    HPI  Last labs-5/2023   ldl normal.  Hdl low at 38. Goal >40 for men. Incr exercise.  Trigs high at 185. Goal <150. Decr carbs and sugars.  Liver enzymes normal  3/2023 vit d nl, tsh nl, cbc nl, 1 liver enz high at 59 (nl up to 49), sugar nl at 89, trigs 167, ldl 164   Due for labs- all, D    Cholesterol   Date Value Ref Range Status   03/16/2022 237 (H) 0 - 199 mg/dL Final     Comment:     .      AGE      DESIRABLE   BORDERLINE HIGH   HIGH     0-19 Y     0 - 169       170 - 199     >/= 200    20-24 Y     0 - 189       190 - 224     >/= 225         >24 Y     0 - 199       200 - 239     >/= 240   **All ranges are based on fasting samples. Specific   therapeutic targets will vary based on patient-specific   cardiac risk.  .   Pediatric guidelines reference:Pediatrics 2011, 128(S5).   Adult guidelines reference: NCEP ATPIII Guidelines,     JEANNETTE 2001, 258:2486-97  .   Venipuncture immediately after or during the    administration of Metamizole may lead to falsely   low results. Testing should be performed immediately   prior to Metamizole dosing.     05/11/2021 241 (H) 0 - 199 mg/dL Final     Comment:     .      AGE      DESIRABLE   BORDERLINE HIGH   HIGH     0-19 Y     0 - 169       170 - 199     >/= 200    20-24 Y     0 - 189       190 - 224     >/= 225         >24 Y     0 - 199       200 - 239     >/= 240   **All ranges are based on fasting samples. Specific   therapeutic targets will vary based on patient-specific   cardiac risk.  .   Pediatric guidelines reference:Pediatrics 2011, 128(S5).   Adult guidelines reference: NCEP ATPIII Guidelines,     JEANNETTE 2001, 258:2486-97  .   Venipuncture  immediately after or during the    administration of Metamizole may lead to falsely   low results. Testing should be performed immediately   prior to Metamizole dosing.       Triglycerides   Date Value Ref Range Status   03/16/2022 239 (H) 0 - 149 mg/dL Final     Comment:     .      AGE      DESIRABLE   BORDERLINE HIGH   HIGH     VERY HIGH   0 D-90 D    19 - 174         ----         ----        ----  91 D- 9 Y     0 -  74        75 -  99     >/= 100      ----    10-19 Y     0 -  89        90 - 129     >/= 130      ----    20-24 Y     0 - 114       115 - 149     >/= 150      ----         >24 Y     0 - 149       150 - 199    200- 499    >/= 500  .   Venipuncture immediately after or during the    administration of Metamizole may lead to falsely   low results. Testing should be performed immediately   prior to Metamizole dosing.     05/11/2021 265 (H) 0 - 149 mg/dL Final     Comment:     .      AGE      DESIRABLE   BORDERLINE HIGH   HIGH     VERY HIGH   0 D-90 D    19 - 174         ----         ----        ----  91 D- 9 Y     0 -  74        75 -  99     >/= 100      ----    10-19 Y     0 -  89        90 - 129     >/= 130      ----    20-24 Y     0 - 114       115 - 149     >/= 150      ----         >24 Y     0 - 149       150 - 199    200- 499    >/= 500  .   Venipuncture immediately after or during the    administration of Metamizole may lead to falsely   low results. Testing should be performed immediately   prior to Metamizole dosing.       HDL   Date Value Ref Range Status   03/16/2022 37.5 (A) mg/dL Final     Comment:     .      AGE      VERY LOW   LOW     NORMAL    HIGH       0-19 Y       < 35   < 40     40-45     ----    20-24 Y       ----   < 40       >45     ----      >24 Y       ----   < 40     40-60      >60  .     05/11/2021 37.5 (A) mg/dL Final     Comment:     .      AGE      VERY LOW   LOW     NORMAL    HIGH       0-19 Y       < 35   < 40     40-45     ----    20-24 Y       ----   < 40       >45      "----      >24 Y       ----   < 40     40-60      >60  .       No results found for: \"LDL\"  TSH   Date Value Ref Range Status   03/16/2022 2.41 0.44 - 3.98 mIU/L Final     Comment:      TSH testing is performed using different testing    methodology at [facility] than at other    [facility] hospitals. Direct result comparisons should    only be made within the same method.       No results found for: \"A1C\"  No components found for: \"POCA1C\"  No results found for: \"ALBUR\"  No components found for: \"POCALBUR\"      Other concerns: sometimes hard to start urinating and can't get it all out on off a few yrs; worse the last yr  Ginseng-1000mg a day    bps at home- none    ER/urgicare visits in the last year- none  Hospitalizations in the last year- none      FH colon ca-none  FH prostate ca-mat gf      Exercise- run once a wk and strength at gym once a wk; conductor music, speed walking  Diet-3 meals   Body mass index is 25.63 kg/m².    Had lasik    last dental appt- 2mon ago    BMs-regular  Sleep-able to fall asleep and stay asleep; no snoring or apnea  no cp, swelling, sob, abd pain, n/v/d/c, blood in stool or black stools  STI testing including hiv (age 15-65) and hep c screening (18-79)-declines        Immunization History   Administered Date(s) Administered    Influenza, seasonal, injectable 10/06/2022    Tdap vaccine, age 7 year and older (BOOSTRIX, ADACEL) 03/10/2023         fractures in lifetime-none  Anyone with osteoporosis in the family-none    FH heart attack, heart surgery-pgf pacemaker, mi  FH stroke-dad, mgm    The 10-year ASCVD risk score (Susanne SOLIS, et al., 2019) is: 1.7%    Values used to calculate the score:      Age: 38 years      Sex: Male      Is Non- : No      Diabetic: No      Tobacco smoker: No      Systolic Blood Pressure: 107 mmHg      Is BP treated: No      HDL Cholesterol: 37.5 mg/dL      Total Cholesterol: 237 mg/dL  Coronary Artery Calcium score:  This test " is recommended for men 45 or older and women 55 or older without a history of heart disease and have 1 risk factor (high LDL cholesterol, low HDL cholesterol, high blood pressure, smoker (current or past), type 2 diabetes, IBD, lupus, RA, ankylosing spondylitis, psoriasis or family history of  heart disease <55yrs in dad, brother or child or <65yrs in mom, sister, or child.)       Review of Systems   Constitutional:  Negative for chills, fatigue and fever.   HENT:  Negative for congestion, ear pain, postnasal drip, rhinorrhea and sore throat.    Eyes:  Negative for discharge, redness and visual disturbance.   Respiratory:  Negative for cough, shortness of breath and wheezing.    Cardiovascular:  Negative for chest pain, palpitations and leg swelling.   Gastrointestinal:  Negative for abdominal pain, blood in stool, constipation, diarrhea, nausea and vomiting.   Endocrine: Negative for cold intolerance, polydipsia, polyphagia and polyuria.   Genitourinary:  Negative for dysuria, frequency, genital sores, hematuria, penile pain, testicular pain and urgency.   Musculoskeletal:  Negative for arthralgias and myalgias.   Skin:  Negative for rash.   Neurological:  Negative for dizziness, syncope and headaches.   Hematological:  Negative for adenopathy. Does not bruise/bleed easily.   Psychiatric/Behavioral:  Negative for dysphoric mood. The patient is not nervous/anxious.        Objective   Visit Vitals  /71   Pulse 71   Temp 36.5 °C (97.7 °F)      BP Readings from Last 3 Encounters:   03/12/24 107/71   03/16/23 124/72   03/10/23 110/72     Wt Readings from Last 3 Encounters:   03/12/24 85.7 kg (189 lb)   03/16/23 87.1 kg (192 lb)   03/10/23 85.7 kg (189 lb)           Physical Exam  Vitals reviewed.   Constitutional:       General: He is not in acute distress.     Appearance: Normal appearance. He is not ill-appearing.   HENT:      Head: Normocephalic.      Right Ear: Tympanic membrane, ear canal and external ear  normal.      Left Ear: Tympanic membrane, ear canal and external ear normal.      Nose: Nose normal.      Mouth/Throat:      Mouth: Mucous membranes are moist.      Pharynx: Oropharynx is clear. No oropharyngeal exudate or posterior oropharyngeal erythema.   Eyes:      Extraocular Movements: Extraocular movements intact.      Conjunctiva/sclera: Conjunctivae normal.      Pupils: Pupils are equal, round, and reactive to light.   Cardiovascular:      Rate and Rhythm: Normal rate and regular rhythm.      Heart sounds: Normal heart sounds. No murmur heard.  Pulmonary:      Effort: Pulmonary effort is normal. No respiratory distress.      Breath sounds: Normal breath sounds. No wheezing, rhonchi or rales.   Abdominal:      General: Bowel sounds are normal. There is no distension.      Palpations: Abdomen is soft. There is no mass.      Tenderness: There is no abdominal tenderness.   Musculoskeletal:         General: No swelling or tenderness. Normal range of motion.      Cervical back: Normal range of motion and neck supple. No tenderness.      Right lower leg: No edema.      Left lower leg: No edema.   Lymphadenopathy:      Cervical: No cervical adenopathy.   Skin:     General: Skin is warm.      Findings: No rash.   Neurological:      General: No focal deficit present.      Mental Status: He is alert and oriented to person, place, and time.      Cranial Nerves: No cranial nerve deficit.   Psychiatric:         Mood and Affect: Mood normal.         Behavior: Behavior normal.       Assessment/Plan   Diagnoses and all orders for this visit:  Routine general medical examination at a health care facility  -     Comprehensive Metabolic Panel; Future  -     CBC and Auto Differential; Future  -     Lipid Panel; Future  -     TSH with reflex to Free T4 if abnormal; Future  Vitamin D deficiency  -     Vitamin D 25-Hydroxy,Total (for eval of Vitamin D levels); Future  Pure hypercholesterolemia  -     rosuvastatin (Crestor) 10 mg  tablet; TAKE 1 TABLET BY MOUTH EVERY DAY  Difficulty urinating  -     Urine Culture; Future  -     POCT UA Automated manually resulted; Future  -     Referral to Urology; Future  -     Prostate Specific Antigen; Future  BMI 25.0-25.9,adult  Other orders  -     Follow Up In Primary Care - Health Maintenance; Future

## 2024-03-13 LAB — BACTERIA UR CULT: NORMAL

## 2024-03-28 ENCOUNTER — PATIENT MESSAGE (OUTPATIENT)
Dept: PRIMARY CARE | Facility: CLINIC | Age: 39
End: 2024-03-28
Payer: COMMERCIAL

## 2024-03-28 DIAGNOSIS — M54.2 NECK PAIN: Primary | ICD-10-CM

## 2024-03-28 RX ORDER — ORPHENADRINE CITRATE 100 MG/1
100 TABLET, EXTENDED RELEASE ORAL 2 TIMES DAILY PRN
Qty: 60 TABLET | Refills: 5 | Status: SHIPPED | OUTPATIENT
Start: 2024-03-28 | End: 2024-09-24

## 2024-05-09 ENCOUNTER — OFFICE VISIT (OUTPATIENT)
Dept: UROLOGY | Facility: CLINIC | Age: 39
End: 2024-05-09
Payer: COMMERCIAL

## 2024-05-09 VITALS
BODY MASS INDEX: 25.73 KG/M2 | DIASTOLIC BLOOD PRESSURE: 86 MMHG | SYSTOLIC BLOOD PRESSURE: 129 MMHG | HEIGHT: 72 IN | WEIGHT: 190 LBS | HEART RATE: 87 BPM

## 2024-05-09 DIAGNOSIS — Z87.438 HISTORY OF TESTICULAR LUMP: ICD-10-CM

## 2024-05-09 DIAGNOSIS — N40.1 BENIGN PROSTATIC HYPERPLASIA WITH URINARY HESITANCY: ICD-10-CM

## 2024-05-09 DIAGNOSIS — R39.198 DIFFICULTY URINATING: Primary | ICD-10-CM

## 2024-05-09 DIAGNOSIS — R93.89 ABNORMAL GENITOURINARY ULTRASOUND: ICD-10-CM

## 2024-05-09 DIAGNOSIS — N52.9 ERECTILE DYSFUNCTION, UNSPECIFIED ERECTILE DYSFUNCTION TYPE: ICD-10-CM

## 2024-05-09 DIAGNOSIS — R39.11 BENIGN PROSTATIC HYPERPLASIA WITH URINARY HESITANCY: ICD-10-CM

## 2024-05-09 LAB
POC APPEARANCE, URINE: CLEAR
POC BILIRUBIN, URINE: NEGATIVE
POC BLOOD, URINE: ABNORMAL
POC COLOR, URINE: YELLOW
POC GLUCOSE, URINE: NEGATIVE MG/DL
POC KETONES, URINE: NEGATIVE MG/DL
POC LEUKOCYTES, URINE: NEGATIVE
POC NITRITE,URINE: NEGATIVE
POC PH, URINE: 6.5 PH
POC PROTEIN, URINE: NEGATIVE MG/DL
POC SPECIFIC GRAVITY, URINE: 1.01
POC UROBILINOGEN, URINE: 0.2 EU/DL

## 2024-05-09 PROCEDURE — 81003 URINALYSIS AUTO W/O SCOPE: CPT

## 2024-05-09 PROCEDURE — 3008F BODY MASS INDEX DOCD: CPT | Performed by: NURSE PRACTITIONER

## 2024-05-09 PROCEDURE — 51798 US URINE CAPACITY MEASURE: CPT | Performed by: NURSE PRACTITIONER

## 2024-05-09 PROCEDURE — 1036F TOBACCO NON-USER: CPT | Performed by: NURSE PRACTITIONER

## 2024-05-09 PROCEDURE — 99204 OFFICE O/P NEW MOD 45 MIN: CPT | Performed by: NURSE PRACTITIONER

## 2024-05-09 PROCEDURE — 87086 URINE CULTURE/COLONY COUNT: CPT

## 2024-05-09 PROCEDURE — 81003 URINALYSIS AUTO W/O SCOPE: CPT | Performed by: NURSE PRACTITIONER

## 2024-05-09 RX ORDER — SERTRALINE HYDROCHLORIDE 100 MG/1
100 TABLET, FILM COATED ORAL DAILY
COMMUNITY
Start: 2024-04-08

## 2024-05-09 RX ORDER — TADALAFIL 10 MG/1
10 TABLET ORAL DAILY PRN
Qty: 30 TABLET | Refills: 1 | Status: SHIPPED | OUTPATIENT
Start: 2024-05-09 | End: 2024-07-08

## 2024-05-09 RX ORDER — TAMSULOSIN HYDROCHLORIDE 0.4 MG/1
0.4 CAPSULE ORAL DAILY
Qty: 90 CAPSULE | Refills: 3 | Status: SHIPPED | OUTPATIENT
Start: 2024-05-09 | End: 2025-05-09

## 2024-05-09 RX ORDER — ACETAMINOPHEN 500 MG
5000 TABLET ORAL DAILY
COMMUNITY

## 2024-05-09 NOTE — PROGRESS NOTES
05/09/24   [id number]    Chief Complaint   Patient presents with    Difficulty Urinating    urinary hesitancy   Follow up left testicle lump, weaker erection, some urine leakage      Subjective      HPI Amanuel Chan is a 39 y.o. male who presents for urinary hesitancy, difficulty urinating, left testicle lump follow up, weaker erections and some urine leakage;     Kindly referred by Deborah Piper, CNP    Weak stream for a while gradually worsening; used to do penis enlargement exercises and pills through [employer], wondered if related; difficulty w stream day and night, feels like he doesn't empty; has to milk out urine from behind scrotum; dribble after urination better since using technique; no urgency;     Hasn't felt left testicle lump or bothered him lately, but not really monitoring; wife had noticed it back few years ago;     Erections more of an issues since 2017 when started psychiatric medications, most of the time can get erection but maybe not as hard, would block vein to help w erection;     No hematuria, occasional UTI;   DTF 5-6 x, NTF 0 x    PVR 0 ml, UA trace heme today    PSA 3.06 on 3/12/24  Creatinine 0.92 on 3/12/24    US scrotum 5/21/21: 1. Bilateral testicular calcifications without any surrounding mass; lesion could be sequelae of prior inflammation but cannot definitely  differentiate from burnt out germ-cell tumor. Recommend continued  attention to this on follow-up imaging  2. Small left-sided varicocele.         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 in shoulders;   PSH lasik  FH prostate Maternal grandfather  (70s)   , never smoked    KATELIN 20  AUABPH 19    Objective     /86   Pulse 87   Ht 1.829 m (6')   Wt 86.2 kg (190 lb)   BMI 25.77 kg/m²    Physical Exam  Genitourinary:     Comments: Scrotum: no lesions, no cysts, no rashes  Epididymides: normal size, position, symmetric, no masses or  induration  Testis: normal size, position, symmetric, no masses, no hydroceles, nontender  Urethra and meatus: normal size, position, no lesions or discharge  Penis: normal (circumcised) phallus, no palpable plaque, no lesions/masses/deformity  Prostate: nontender, symmetric, left center abnormal? Difficult exam d/t stool  Sphincter: normal tone, brown stool in rectal vault, no hemorrhoids or masses           General: Appears comfortable and in no apparent distress, well nourished  Head: Normocephalic, atraumatic  Neck: trachea midline  Respiratory: respirations unlabored, no wheezes, and no use of accessory muscles  Cardiovascular: at rest no dyspnea, well perfused  Skin: no visible rashes or lesions  Neurologic: grossly intact, oriented to person, place, and time  Psychiatric: mood and affect appropriate  Musculoskeletal: in chair for appt. no difficulty w upper body movement      Assessment/Plan   Problem List Items Addressed This Visit    None  Visit Diagnoses       Difficulty urinating              No orders of the defined types were placed in this encounter.     Cystoscopy w Dr. Paz, difficulty urinating, repeat CALVIN center prostate sl abnormal?  Difficult exam w stool in rectum;    Urine culture 2 weeks prior    Tamsulosin 0.4 mg at bedtime discussed risk/benefits    Cialis 10 mg prior to intimacy for ED    Discussed cialis low dose as well but will start w this plan    Scrotal US to follow left testicle lump noted in 2021 possible germ tumor on US  Call [phone] to schedule the ultrasound           KRISTIN Reddy-CNP  Lab Results   Component Value Date    GLUCOSE 86 03/12/2024    CALCIUM 9.5 03/12/2024     03/12/2024    K 4.2 03/12/2024    CO2 26 03/12/2024     03/12/2024    BUN 20 03/12/2024    CREATININE 0.92 03/12/2024

## 2024-05-09 NOTE — PATIENT INSTRUCTIONS
Cystoscopy w Dr. Paz, difficulty urinating  Urine culture 2 weeks prior  Tamsulosin 0.4 mg at bedtime discussed risk/benefits  Cialis 10 mg prior to intimacy for ED  Discussed cialis low dose as well but will start w this plan    Scrotal US to follow left testicle lump noted in 2021 possible germ tumor on US  Call [phone] to schedule the ultrasound

## 2024-05-10 LAB
APPEARANCE UR: CLEAR
BILIRUB UR STRIP.AUTO-MCNC: NEGATIVE MG/DL
COLOR UR: NORMAL
GLUCOSE UR STRIP.AUTO-MCNC: NORMAL MG/DL
KETONES UR STRIP.AUTO-MCNC: NEGATIVE MG/DL
LEUKOCYTE ESTERASE UR QL STRIP.AUTO: NEGATIVE
NITRITE UR QL STRIP.AUTO: NEGATIVE
PH UR STRIP.AUTO: 6.5 [PH]
PROT UR STRIP.AUTO-MCNC: NEGATIVE MG/DL
RBC # UR STRIP.AUTO: NEGATIVE /UL
SP GR UR STRIP.AUTO: 1.01
UROBILINOGEN UR STRIP.AUTO-MCNC: NORMAL MG/DL

## 2024-05-11 LAB — BACTERIA UR CULT: NO GROWTH

## 2024-05-13 ENCOUNTER — HOSPITAL ENCOUNTER (OUTPATIENT)
Dept: RADIOLOGY | Facility: CLINIC | Age: 39
Discharge: HOME | End: 2024-05-13
Payer: COMMERCIAL

## 2024-05-13 DIAGNOSIS — Z87.438 HISTORY OF TESTICULAR LUMP: ICD-10-CM

## 2024-05-13 DIAGNOSIS — R93.89 ABNORMAL GENITOURINARY ULTRASOUND: ICD-10-CM

## 2024-05-13 PROCEDURE — 76870 US EXAM SCROTUM: CPT

## 2024-05-13 PROCEDURE — 76870 US EXAM SCROTUM: CPT | Performed by: STUDENT IN AN ORGANIZED HEALTH CARE EDUCATION/TRAINING PROGRAM

## 2024-05-14 ENCOUNTER — TELEPHONE (OUTPATIENT)
Dept: UROLOGY | Facility: CLINIC | Age: 39
End: 2024-05-14
Payer: COMMERCIAL

## 2024-05-14 NOTE — TELEPHONE ENCOUNTER
----- Message from ЕЛЕНА Reddy sent at 5/13/2024  8:06 AM EDT -----  No hematuria, no infection  ----- Message -----  From: Britney Vargas MA  Sent: 5/9/2024   9:18 AM EDT  To: ЕЛЕНА Reddy

## 2024-06-07 DIAGNOSIS — R30.0 DIFFICULT OR PAINFUL URINATION: Primary | ICD-10-CM

## 2024-06-07 LAB
NON-UH HIE APPEARANCE, U: CLEAR
NON-UH HIE BILIRUBIN, U: NEGATIVE
NON-UH HIE BLOOD, U: NEGATIVE
NON-UH HIE COLOR, U: YELLOW
NON-UH HIE GLUCOSE QUAL, U: NEGATIVE
NON-UH HIE KETONES, U: NEGATIVE
NON-UH HIE LEUKOCYTE ESTERASE, U: NEGATIVE
NON-UH HIE NITRITE, U: NEGATIVE
NON-UH HIE PH, U: 5.5 (ref 4.5–8)
NON-UH HIE PROTEIN, U: NEGATIVE
NON-UH HIE SPECIFIC GRAVITY, U: 1.02 (ref 1–1.03)
NON-UH HIE U MICRO: NORMAL
NON-UH HIE UROBILINOGEN QUAL, U: NORMAL

## 2024-06-13 DIAGNOSIS — N52.9 ERECTILE DYSFUNCTION, UNSPECIFIED ERECTILE DYSFUNCTION TYPE: Primary | ICD-10-CM

## 2024-06-13 RX ORDER — TADALAFIL 5 MG/1
TABLET ORAL
Qty: 30 TABLET | Refills: 2 | Status: SHIPPED | OUTPATIENT
Start: 2024-06-13

## 2024-06-21 ENCOUNTER — APPOINTMENT (OUTPATIENT)
Dept: UROLOGY | Facility: HOSPITAL | Age: 39
End: 2024-06-21
Payer: COMMERCIAL

## 2024-06-21 DIAGNOSIS — R97.20 ELEVATED PSA: ICD-10-CM

## 2024-06-21 DIAGNOSIS — R39.12 WEAK URINARY STREAM: ICD-10-CM

## 2024-06-21 DIAGNOSIS — R39.11 URINARY HESITANCY: Primary | ICD-10-CM

## 2024-06-21 DIAGNOSIS — R39.198 DIFFICULTY URINATING: ICD-10-CM

## 2024-06-21 PROCEDURE — 99213 OFFICE O/P EST LOW 20 MIN: CPT | Performed by: UROLOGY

## 2024-06-21 PROCEDURE — 52000 CYSTOURETHROSCOPY: CPT | Performed by: UROLOGY

## 2024-06-21 NOTE — PROGRESS NOTES
HPI    39 y.o. male being seen with the following problem list:    Problem list:  urinary hesitancy, difficulty urinating     Referred by Maria T Logan for cystoscopy.  Also with question of left testicular lump, though recent scrotal ultrasound was normal.  Was started on Flomax by Maria T.    06/21/24 -seen for cystoscopy.  Notes improvement on Flomax.  Still not perfect but still not perfect.  No history of catheterization.  No history of STDs.  No history of urethral trauma      Lab Results   Component Value Date    PSA 3.06 03/12/2024         Current Medications:  Current Outpatient Medications   Medication Sig Dispense Refill    cholecalciferol (Vitamin D-3) 1,250 mcg (50,000 unit) capsule Take 1 capsule (50,000 Units) by mouth 1 (one) time per week.      cholecalciferol (Vitamin D3) 5,000 Units tablet Take 1 tablet (5,000 Units) by mouth once daily.      GINSENG ORAL GINSENG 1000MG      multivit-min/folic/vit K/lycop (MEN'S MULTIVITAMIN ORAL) daily      NON FORMULARY Elderberry Zinc/Vit C/Immune Mouth/Throat Lozenge      omega-3/dha/epa/fish oil (OMEGA-3 ORAL) Take 1,000 mg by mouth once daily.      orphenadrine (Norflex) 100 mg 12 hr tablet Take 1 tablet (100 mg) by mouth 2 times a day as needed for muscle spasms or mild pain (1 - 3). Do not crush, chew, or split. 60 tablet 5    QUEtiapine (SEROquel) 400 mg tablet Take 500 mg by mouth once daily at bedtime.      rosuvastatin (Crestor) 10 mg tablet TAKE 1 TABLET BY MOUTH EVERY DAY 90 tablet 4    sertraline (Zoloft) 100 mg tablet Take 1 tablet (100 mg) by mouth once daily.      tadalafil (Cialis) 5 mg tablet Take one tablet an hour before intimacy 30 tablet 2    tamsulosin (Flomax) 0.4 mg 24 hr capsule Take 1 capsule (0.4 mg) by mouth once daily. 90 capsule 3    turmeric root extract 500 mg tablet once daily.       No current facility-administered medications for this visit.        Active Problems:  Amanuel Chan is a 39 y.o. male with the following Problems and  Medications.  Patient Active Problem List   Diagnosis    Anxiety    Bipolar 2 disorder (Multi)    Depression    History of OCD (obsessive compulsive disorder)    Sebaceous cyst    Vitamin D deficiency    Routine general medical examination at a health care facility    Encounter for vaccination    BMI 25.0-25.9,adult    Pure hypercholesterolemia    Elevated liver enzymes     Current Outpatient Medications   Medication Sig Dispense Refill    cholecalciferol (Vitamin D-3) 1,250 mcg (50,000 unit) capsule Take 1 capsule (50,000 Units) by mouth 1 (one) time per week.      cholecalciferol (Vitamin D3) 5,000 Units tablet Take 1 tablet (5,000 Units) by mouth once daily.      GINSENG ORAL GINSENG 1000MG      multivit-min/folic/vit K/lycop (MEN'S MULTIVITAMIN ORAL) daily      NON FORMULARY Elderberry Zinc/Vit C/Immune Mouth/Throat Lozenge      omega-3/dha/epa/fish oil (OMEGA-3 ORAL) Take 1,000 mg by mouth once daily.      orphenadrine (Norflex) 100 mg 12 hr tablet Take 1 tablet (100 mg) by mouth 2 times a day as needed for muscle spasms or mild pain (1 - 3). Do not crush, chew, or split. 60 tablet 5    QUEtiapine (SEROquel) 400 mg tablet Take 500 mg by mouth once daily at bedtime.      rosuvastatin (Crestor) 10 mg tablet TAKE 1 TABLET BY MOUTH EVERY DAY 90 tablet 4    sertraline (Zoloft) 100 mg tablet Take 1 tablet (100 mg) by mouth once daily.      tadalafil (Cialis) 5 mg tablet Take one tablet an hour before intimacy 30 tablet 2    tamsulosin (Flomax) 0.4 mg 24 hr capsule Take 1 capsule (0.4 mg) by mouth once daily. 90 capsule 3    turmeric root extract 500 mg tablet once daily.       No current facility-administered medications for this visit.       PMH:  Past Medical History:   Diagnosis Date    Contact with and (suspected) exposure to other viral communicable diseases     Mono exposure    Encounter for immunization     Encounter for immunization    Mononeuropathy, unspecified     Pinched nerve    Personal history of other  infectious and parasitic diseases     History of varicella       PSH:  Past Surgical History:   Procedure Laterality Date    OTHER SURGICAL HISTORY  05/11/2021    Corneal lasik       FMH:  Family History   Problem Relation Name Age of Onset    Depression Father      Depression Father's Brother      Other (Cerebrovascular Accident) Maternal Grandmother      Depression Paternal Grandfather      Hypertension Paternal Grandfather      Prostate cancer Paternal Grandfather      Heart attack Paternal Grandfather      Other (H/o heart bypass surgery) Paternal Grandfather      Other (Cardiac Disorder) Other Grandfather     Cancer Other Grandfather     Depression Paternal Great-Grandmother      Depression Paternal Great-Grandfather         SHx:  Social History     Tobacco Use    Smoking status: Never    Smokeless tobacco: Never   Substance Use Topics    Alcohol use: Yes    Drug use: Never     Comment: h/o drug abuse       Allergies:  No Known Allergies    Procedure:  After informed consent was obtained, the patient was taken to the procedure room for cystoscopy due to weak stream, urinary hesitancy.     Cystoscopy     Procedure Note:    A sterile prep and drape was performed in standard fashion. Lidocaine was used for topical anesthesia. A flexible cystoscope was inserted into the urethra without difficulty revealing normal urethra.     The prostate was bilobar, nonobstructive.    Then entered the bladder revealing bladder mucosa with no erythematous patches or plaques, foreign bodies, stones or papillary lesions. The ureteral orifices were visualized bilaterally. These were orthotopic in location and effluxing clear urine. No masses were seen on retroflexion.     Post-Procedure:   The cystoscope was removed. The vital signs were stable . The patient tolerated the procedure well. There were no complications.               Assessment/Plan  Cystoscopy completely normal.  He has been on Flomax, also recommended pelvic floor  physical therapy which he would like to try.  PSA was higher than would be expected from his age, would recheck in 3 months.  I will see him in 3 months over telehealth PSA prior.  Will continue seeing an for his urinary issues.        Scribe Attestation  By signing my name below, I, Joseph Cook, attest that this documentation  has been prepared under the direction and in the presence of Margarito Paz MD.

## 2024-08-25 ASSESSMENT — DERMATOLOGY QUALITY OF LIFE (QOL) ASSESSMENT
DATE THE QUALITY-OF-LIFE ASSESSMENT WAS COMPLETED: 67077
RATE HOW BOTHERED YOU ARE BY EFFECTS OF YOUR SKIN PROBLEMS ON YOUR ACTIVITIES (EG, GOING OUT, ACCOMPLISHING WHAT YOU WANT, WORK ACTIVITIES OR YOUR RELATIONSHIPS WITH OTHERS): 0 - NEVER BOTHERED
RATE HOW EMOTIONALLY BOTHERED YOU ARE BY YOUR SKIN PROBLEM (FOR EXAMPLE, WORRY, EMBARRASSMENT, FRUSTRATION): 0 - NEVER BOTHERED
WHAT SINGLE SKIN CONDITION LISTED BELOW IS THE PATIENT ANSWERING THE QUALITY-OF-LIFE ASSESSMENT QUESTIONS ABOUT: NONE OF THE ABOVE
RATE HOW BOTHERED YOU ARE BY SYMPTOMS OF YOUR SKIN PROBLEM (EG, ITCHING, STINGING BURNING, HURTING OR SKIN IRRITATION): 1
WHAT SINGLE SKIN CONDITION LISTED BELOW IS THE PATIENT ANSWERING THE QUALITY-OF-LIFE ASSESSMENT QUESTIONS ABOUT: NONE OF THE ABOVE
RATE HOW EMOTIONALLY BOTHERED YOU ARE BY YOUR SKIN PROBLEM (FOR EXAMPLE, WORRY, EMBARRASSMENT, FRUSTRATION): 0 - NEVER BOTHERED
RATE HOW BOTHERED YOU ARE BY EFFECTS OF YOUR SKIN PROBLEMS ON YOUR ACTIVITIES (EG, GOING OUT, ACCOMPLISHING WHAT YOU WANT, WORK ACTIVITIES OR YOUR RELATIONSHIPS WITH OTHERS): 0 - NEVER BOTHERED
RATE HOW BOTHERED YOU ARE BY SYMPTOMS OF YOUR SKIN PROBLEM (EG, ITCHING, STINGING BURNING, HURTING OR SKIN IRRITATION): 1

## 2024-08-25 ASSESSMENT — PATIENT GLOBAL ASSESSMENT (PGA): WHAT IS THE PGA: PATIENT GLOBAL ASSESSMENT:  1 - CLEAR

## 2024-08-26 ENCOUNTER — APPOINTMENT (OUTPATIENT)
Dept: DERMATOLOGY | Facility: CLINIC | Age: 39
End: 2024-08-26
Payer: COMMERCIAL

## 2024-08-26 DIAGNOSIS — L72.11 PILAR CYST: Primary | ICD-10-CM

## 2024-08-26 DIAGNOSIS — L72.3 SEBACEOUS CYST: ICD-10-CM

## 2024-08-26 PROCEDURE — 99203 OFFICE O/P NEW LOW 30 MIN: CPT | Performed by: NURSE PRACTITIONER

## 2024-08-26 PROCEDURE — 1036F TOBACCO NON-USER: CPT | Performed by: NURSE PRACTITIONER

## 2024-08-26 NOTE — PROGRESS NOTES
Subjective     Amanuel Chan is a 39 y.o. male who presents for the following: Rash.   New patient in for 25 counted by wife lesions all over body present for 10 years no prior treatment.     Review of Systems:  No other skin or systemic complaints other than what is documented elsewhere in the note.    The following portions of the chart were reviewed this encounter and updated as appropriate:       Skin Cancer History  No skin cancer on file.    Specialty Problems          Dermatology Problems    Sebaceous cyst     Past Medical History:  Amanuel Chan  has a past medical history of Contact with and (suspected) exposure to other viral communicable diseases, Encounter for immunization, Mononeuropathy, unspecified, and Personal history of other infectious and parasitic diseases.    Past Surgical History:  Amanuel Chan  has a past surgical history that includes Other surgical history (05/11/2021).    Family History:  Patient family history includes Cancer in an other family member; Cardiac Disorder in an other family member; Cerebrovascular Accident in his maternal grandmother; Depression in his father, father's brother, paternal grandfather, paternal great-grandfather, and paternal great-grandmother; H/o heart bypass surgery in his paternal grandfather; Heart attack in his paternal grandfather; Hypertension in his paternal grandfather; Prostate cancer in his paternal grandfather.    Social History:  Amanuel Chan  reports that he has never smoked. He has never used smokeless tobacco. He reports current alcohol use. He reports that he does not use drugs.    Allergies:  Patient has no known allergies.    Current Medications / CAM's:    Current Outpatient Medications:     cholecalciferol (Vitamin D-3) 1,250 mcg (50,000 unit) capsule, Take 1 capsule (50,000 Units) by mouth 1 (one) time per week., Disp: , Rfl:     cholecalciferol (Vitamin D3) 5,000 Units tablet, Take 1 tablet (5,000 Units) by mouth once daily.,  "Disp: , Rfl:     GINSENG ORAL, GINSENG 1000MG, Disp: , Rfl:     multivit-min/folic/vit K/lycop (MEN'S MULTIVITAMIN ORAL), daily, Disp: , Rfl:     NON FORMULARY, Elderberry Zinc/Vit C/Immune Mouth/Throat Lozenge, Disp: , Rfl:     omega-3/dha/epa/fish oil (OMEGA-3 ORAL), Take 1,000 mg by mouth once daily., Disp: , Rfl:     orphenadrine (Norflex) 100 mg 12 hr tablet, Take 1 tablet (100 mg) by mouth 2 times a day as needed for muscle spasms or mild pain (1 - 3). Do not crush, chew, or split., Disp: 60 tablet, Rfl: 5    QUEtiapine (SEROquel) 400 mg tablet, Take 500 mg by mouth once daily at bedtime., Disp: , Rfl:     rosuvastatin (Crestor) 10 mg tablet, TAKE 1 TABLET BY MOUTH EVERY DAY, Disp: 90 tablet, Rfl: 4    sertraline (Zoloft) 100 mg tablet, Take 1 tablet (100 mg) by mouth once daily., Disp: , Rfl:     tadalafil (Cialis) 5 mg tablet, Take one tablet an hour before intimacy, Disp: 30 tablet, Rfl: 2    tamsulosin (Flomax) 0.4 mg 24 hr capsule, Take 1 capsule (0.4 mg) by mouth once daily., Disp: 90 capsule, Rfl: 3    turmeric root extract 500 mg tablet, once daily., Disp: , Rfl:      Objective   Well appearing patient in no apparent distress; mood and affect are within normal limits.      Assessment/Plan   1. Pilar cyst  Scalp    2. Sebaceous cyst (2)  Left Forearm - Anterior, Right Buttock  2-3 cm round, nontender, mobile nodules that are asymptomatic in nature.  Patient states he wife has found \"25\" of them on his body, but only three of them examined today.      Ddx includes lipoma     PLAN:  No treatment indicated  Reassurance there are no lesions concerning for underlying cancer today          "

## 2024-09-19 LAB — NON-UH HIE PROSTATIC SPECIFIC ANTIGEN: 0.5 NG/ML (ref 0–4)

## 2024-09-24 ENCOUNTER — APPOINTMENT (OUTPATIENT)
Dept: UROLOGY | Facility: HOSPITAL | Age: 39
End: 2024-09-24
Payer: COMMERCIAL

## 2024-09-24 NOTE — PROGRESS NOTES
HPI    39 y.o. male being seen with the following problem list:    Problem list:  urinary hesitancy, difficulty urinating      Referred by Maria T Logan for cystoscopy.  Also with question of left testicular lump, though recent scrotal ultrasound was normal.  Was started on Flomax by Maria T.     06/21/24 -seen for cystoscopy.  Notes improvement on Flomax.  Still not perfect but still not perfect.  No history of catheterization.  No history of STDs.  No history of urethral trauma. I recommend PFPT    9/24 PSA 0.5    09/24/24 - is doing PFPT, been helping a lot.      Lab Results   Component Value Date    PSA 3.06 03/12/2024         Current Medications:  Current Outpatient Medications   Medication Sig Dispense Refill    cholecalciferol (Vitamin D-3) 1,250 mcg (50,000 unit) capsule Take 1 capsule (50,000 Units) by mouth 1 (one) time per week.      cholecalciferol (Vitamin D3) 5,000 Units tablet Take 1 tablet (5,000 Units) by mouth once daily.      GINSENG ORAL GINSENG 1000MG      multivit-min/folic/vit K/lycop (MEN'S MULTIVITAMIN ORAL) daily      NON FORMULARY Elderberry Zinc/Vit C/Immune Mouth/Throat Lozenge      omega-3/dha/epa/fish oil (OMEGA-3 ORAL) Take 1,000 mg by mouth once daily.      orphenadrine (Norflex) 100 mg 12 hr tablet Take 1 tablet (100 mg) by mouth 2 times a day as needed for muscle spasms or mild pain (1 - 3). Do not crush, chew, or split. 60 tablet 5    QUEtiapine (SEROquel) 400 mg tablet Take 500 mg by mouth once daily at bedtime.      rosuvastatin (Crestor) 10 mg tablet TAKE 1 TABLET BY MOUTH EVERY DAY 90 tablet 4    sertraline (Zoloft) 100 mg tablet Take 1 tablet (100 mg) by mouth once daily.      tadalafil (Cialis) 5 mg tablet Take one tablet an hour before intimacy 30 tablet 2    tamsulosin (Flomax) 0.4 mg 24 hr capsule Take 1 capsule (0.4 mg) by mouth once daily. 90 capsule 3    turmeric root extract 500 mg tablet once daily.       No current facility-administered medications for this visit.         Active Problems:  Amanuel Chan is a 39 y.o. male with the following Problems and Medications.  Patient Active Problem List   Diagnosis    Anxiety    Bipolar 2 disorder (Multi)    Depression    History of OCD (obsessive compulsive disorder)    Sebaceous cyst    Vitamin D deficiency    Routine general medical examination at a health care facility    Encounter for vaccination    BMI 25.0-25.9,adult    Pure hypercholesterolemia    Elevated liver enzymes    Elevated PSA    Weak urinary stream    Urinary hesitancy    Difficulty urinating     Current Outpatient Medications   Medication Sig Dispense Refill    cholecalciferol (Vitamin D-3) 1,250 mcg (50,000 unit) capsule Take 1 capsule (50,000 Units) by mouth 1 (one) time per week.      cholecalciferol (Vitamin D3) 5,000 Units tablet Take 1 tablet (5,000 Units) by mouth once daily.      GINSENG ORAL GINSENG 1000MG      multivit-min/folic/vit K/lycop (MEN'S MULTIVITAMIN ORAL) daily      NON FORMULARY Elderberry Zinc/Vit C/Immune Mouth/Throat Lozenge      omega-3/dha/epa/fish oil (OMEGA-3 ORAL) Take 1,000 mg by mouth once daily.      orphenadrine (Norflex) 100 mg 12 hr tablet Take 1 tablet (100 mg) by mouth 2 times a day as needed for muscle spasms or mild pain (1 - 3). Do not crush, chew, or split. 60 tablet 5    QUEtiapine (SEROquel) 400 mg tablet Take 500 mg by mouth once daily at bedtime.      rosuvastatin (Crestor) 10 mg tablet TAKE 1 TABLET BY MOUTH EVERY DAY 90 tablet 4    sertraline (Zoloft) 100 mg tablet Take 1 tablet (100 mg) by mouth once daily.      tadalafil (Cialis) 5 mg tablet Take one tablet an hour before intimacy 30 tablet 2    tamsulosin (Flomax) 0.4 mg 24 hr capsule Take 1 capsule (0.4 mg) by mouth once daily. 90 capsule 3    turmeric root extract 500 mg tablet once daily.       No current facility-administered medications for this visit.       PMH:  Past Medical History:   Diagnosis Date    Contact with and (suspected) exposure to other viral  communicable diseases     Mono exposure    Encounter for immunization     Encounter for immunization    Mononeuropathy, unspecified     Pinched nerve    Personal history of other infectious and parasitic diseases     History of varicella       PSH:  Past Surgical History:   Procedure Laterality Date    OTHER SURGICAL HISTORY  05/11/2021    Corneal lasik       FMH:  Family History   Problem Relation Name Age of Onset    Depression Father      Depression Father's Brother      Other (Cerebrovascular Accident) Maternal Grandmother      Depression Paternal Grandfather      Hypertension Paternal Grandfather      Prostate cancer Paternal Grandfather      Heart attack Paternal Grandfather      Other (H/o heart bypass surgery) Paternal Grandfather      Other (Cardiac Disorder) Other Grandfather     Cancer Other Grandfather     Depression Paternal Great-Grandmother      Depression Paternal Great-Grandfather         SHx:  Social History     Tobacco Use    Smoking status: Never    Smokeless tobacco: Never   Substance Use Topics    Alcohol use: Yes    Drug use: Never     Comment: h/o drug abuse       Allergies:  No Known Allergies    Assessment/Plan  PSA back down to a normal, appropriate level for a man of his age. Recommend resuming normal screening at age 50 per AUA guidelines.    He will stop the flomax and see how he does. Will continue to follow up with Maria T Logan for his pelvic floor issues.        Scribe Attestation  By signing my name below, I, Joseph Johnson, attest that this documentation  has been prepared under the direction and in the presence of Margarito Paz MD.

## 2024-09-25 ENCOUNTER — TELEMEDICINE (OUTPATIENT)
Dept: UROLOGY | Facility: HOSPITAL | Age: 39
End: 2024-09-25
Payer: COMMERCIAL

## 2024-09-25 DIAGNOSIS — R97.20 ELEVATED PSA: Primary | ICD-10-CM

## 2024-09-25 DIAGNOSIS — R39.198 DIFFICULTY URINATING: ICD-10-CM

## 2024-09-25 PROCEDURE — 99213 OFFICE O/P EST LOW 20 MIN: CPT | Performed by: UROLOGY

## 2025-02-12 PROBLEM — R74.8 ELEVATED LIVER ENZYMES: Status: RESOLVED | Noted: 2023-03-12 | Resolved: 2025-02-12

## 2025-02-12 ASSESSMENT — ENCOUNTER SYMPTOMS
NAUSEA: 0
HEADACHES: 0
FREQUENCY: 0
NERVOUS/ANXIOUS: 0
VOMITING: 0
RHINORRHEA: 0
MYALGIAS: 0
SHORTNESS OF BREATH: 0
POLYDIPSIA: 0
ARTHRALGIAS: 0
FEVER: 0
DYSURIA: 0
CHILLS: 0
POLYPHAGIA: 0
BRUISES/BLEEDS EASILY: 0
SORE THROAT: 0
DIARRHEA: 0
DIZZINESS: 0
HEMATURIA: 0
EYE DISCHARGE: 0
ADENOPATHY: 0
WHEEZING: 0
FATIGUE: 0
CONSTIPATION: 0
COUGH: 0
ABDOMINAL PAIN: 0
BLOOD IN STOOL: 0
PALPITATIONS: 0
DYSPHORIC MOOD: 0
EYE REDNESS: 0

## 2025-02-12 NOTE — PROGRESS NOTES
Subjective   Patient ID: Amanuel Chan is a 39 y.o. male who presents for Annual Exam.    Is pt fasting? yes  Does pt see any providers other than care team below?   Scott farnsworth-psychiatrist-retiring or retired  Did pt find a new psychiatrist or should I give him a referral? Dr.Navjoth bryson    Any forms to fill out? Yes   *Is pt taking otc vit d 5000iu 1 a day or vitamin d rx? otc  Any questions or concerns today? no    Phq9= 2  ,gad7=1    No care team member to display    HPI  Last labs-work labs 1/2025 -lipid-hdl 39, ldl 80, trigs 125, glucose 95, UA done  9/2024 psa nl  3/2024 Hdl and ldl normal.  Continue crestor.  Trigs just slightly high at 156. Goal <150. Decr carbs and sugars.  Sugar (aka glucose), kidney function, liver function and electrolytes in the CMP (comprehensive metabolic panel) were normal.  Vitamin d normal.  Continue rx vitamin d once a wk.  TSH (thyroid test) was normal.  PSA (prostate blood test) is normal.  CBC (complete blood count) was normal which looks at infection and anemia markers.  Due for labs- all    Cholesterol   Date Value Ref Range Status   03/12/2024 161 0 - 199 mg/dL Final     Comment:           Age      Desirable   Borderline High   High     0-19 Y     0 - 169       170 - 199     >/= 200    20-24 Y     0 - 189       190 - 224     >/= 225         >24 Y     0 - 199       200 - 239     >/= 240   **All ranges are based on fasting samples. Specific   therapeutic targets will vary based on patient-specific   cardiac risk.    Pediatric guidelines reference:Pediatrics 2011, 128(S5).Adult guidelines reference: NCEP ATPIII Guidelines,JEANNETTE 2001, 258:2486-97    Venipuncture immediately after or during the administration of Metamizole may lead to falsely low results. Testing should be performed immediately prior to Metamizole dosing.   03/16/2022 237 (H) 0 - 199 mg/dL Final     Comment:     .      AGE      DESIRABLE   BORDERLINE HIGH   HIGH     0-19 Y     0 - 169       170 - 199      >/= 200    20-24 Y     0 - 189       190 - 224     >/= 225         >24 Y     0 - 199       200 - 239     >/= 240   **All ranges are based on fasting samples. Specific   therapeutic targets will vary based on patient-specific   cardiac risk.  .   Pediatric guidelines reference:Pediatrics 2011, 128(S5).   Adult guidelines reference: NCEP ATPIII Guidelines,     JEANNETTE 2001, 258:2486-97  .   Venipuncture immediately after or during the    administration of Metamizole may lead to falsely   low results. Testing should be performed immediately   prior to Metamizole dosing.     05/11/2021 241 (H) 0 - 199 mg/dL Final     Comment:     .      AGE      DESIRABLE   BORDERLINE HIGH   HIGH     0-19 Y     0 - 169       170 - 199     >/= 200    20-24 Y     0 - 189       190 - 224     >/= 225         >24 Y     0 - 199       200 - 239     >/= 240   **All ranges are based on fasting samples. Specific   therapeutic targets will vary based on patient-specific   cardiac risk.  .   Pediatric guidelines reference:Pediatrics 2011, 128(S5).   Adult guidelines reference: NCEP ATPIII Guidelines,     JEANNETTE 2001, 258:2486-97  .   Venipuncture immediately after or during the    administration of Metamizole may lead to falsely   low results. Testing should be performed immediately   prior to Metamizole dosing.       Triglycerides   Date Value Ref Range Status   03/12/2024 156 (H) 0 - 149 mg/dL Final     Comment:        Age         Desirable   Borderline High   High     Very High   0 D-90 D    19 - 174         ----         ----        ----  91 D- 9 Y     0 -  74        75 -  99     >/= 100      ----    10-19 Y     0 -  89        90 - 129     >/= 130      ----    20-24 Y     0 - 114       115 - 149     >/= 150      ----         >24 Y     0 - 149       150 - 199    200- 499    >/= 500    Venipuncture immediately after or during the administration of Metamizole may lead to falsely low results. Testing should be performed immediately prior to Metamizole  dosing.   03/16/2022 239 (H) 0 - 149 mg/dL Final     Comment:     .      AGE      DESIRABLE   BORDERLINE HIGH   HIGH     VERY HIGH   0 D-90 D    19 - 174         ----         ----        ----  91 D- 9 Y     0 -  74        75 -  99     >/= 100      ----    10-19 Y     0 -  89        90 - 129     >/= 130      ----    20-24 Y     0 - 114       115 - 149     >/= 150      ----         >24 Y     0 - 149       150 - 199    200- 499    >/= 500  .   Venipuncture immediately after or during the    administration of Metamizole may lead to falsely   low results. Testing should be performed immediately   prior to Metamizole dosing.     05/11/2021 265 (H) 0 - 149 mg/dL Final     Comment:     .      AGE      DESIRABLE   BORDERLINE HIGH   HIGH     VERY HIGH   0 D-90 D    19 - 174         ----         ----        ----  91 D- 9 Y     0 -  74        75 -  99     >/= 100      ----    10-19 Y     0 -  89        90 - 129     >/= 130      ----    20-24 Y     0 - 114       115 - 149     >/= 150      ----         >24 Y     0 - 149       150 - 199    200- 499    >/= 500  .   Venipuncture immediately after or during the    administration of Metamizole may lead to falsely   low results. Testing should be performed immediately   prior to Metamizole dosing.       HDL-Cholesterol   Date Value Ref Range Status   03/12/2024 39.7 mg/dL Final     Comment:       Age       Very Low   Low     Normal    High    0-19 Y    < 35      < 40     40-45     ----  20-24 Y    ----     < 40      >45      ----        >24 Y      ----     < 40     40-60      >60       HDL   Date Value Ref Range Status   03/16/2022 37.5 (A) mg/dL Final     Comment:     .      AGE      VERY LOW   LOW     NORMAL    HIGH       0-19 Y       < 35   < 40     40-45     ----    20-24 Y       ----   < 40       >45     ----      >24 Y       ----   < 40     40-60      >60  .     05/11/2021 37.5 (A) mg/dL Final     Comment:     .      AGE      VERY LOW   LOW     NORMAL    HIGH       0-19 Y       <  "35   < 40     40-45     ----    20-24 Y       ----   < 40       >45     ----      >24 Y       ----   < 40     40-60      >60  .       No results found for: \"LDL\"  Thyroid Stimulating Hormone   Date Value Ref Range Status   03/12/2024 3.24 0.44 - 3.98 mIU/L Final     No results found for: \"A1C\"  No components found for: \"POCA1C\"  No results found for: \"ALBUR\"  No components found for: \"POCALBUR\"      Other concerns: none    bps at home- none    ER/urgicare visits in the last year- none  Hospitalizations in the last year- none      FH colon ca-none  FH prostate ca-mgf      Exercise- conductor-work, run, lift wts  Diet-3 meals a day   Body mass index is 25.96 kg/m².        last dental appt- 1 mon ago    BMs-regular  Sleep-able to fall asleep and stay asleep; no snoring or apnea  no cp, swelling, sob, abd pain, n/v/d/c, blood in stool or black stools  STI testing including hiv (age 15-65) and hep c screening (18-79)-declines        Immunization History   Administered Date(s) Administered    Influenza, seasonal, injectable 10/06/2022    Tdap vaccine, age 7 year and older (BOOSTRIX, ADACEL) 03/10/2023       fractures in lifetime-none  Fh osteoporosis-none    FH heart attack, heart surgery-pgf pacemaker, mi  FH stroke-dad, mgm    The 10-year ASCVD risk score (Susanne DK, et al., 2019) is: 0.9%    Values used to calculate the score:      Age: 39 years      Sex: Male      Is Non- : No      Diabetic: No      Tobacco smoker: No      Systolic Blood Pressure: 112 mmHg      Is BP treated: No      HDL Cholesterol: 39.7 mg/dL      Total Cholesterol: 161 mg/dL        Review of Systems   Constitutional:  Negative for chills, fatigue and fever.   HENT:  Negative for congestion, ear pain, postnasal drip, rhinorrhea and sore throat.    Eyes:  Negative for discharge, redness and visual disturbance.   Respiratory:  Negative for cough, shortness of breath and wheezing.    Cardiovascular:  Negative for chest pain, " palpitations and leg swelling.   Gastrointestinal:  Negative for abdominal pain, blood in stool, constipation, diarrhea, nausea and vomiting.   Endocrine: Negative for cold intolerance, polydipsia, polyphagia and polyuria.   Genitourinary:  Negative for dysuria, frequency, genital sores, hematuria, penile pain, testicular pain and urgency.   Musculoskeletal:  Negative for arthralgias and myalgias.   Skin:  Negative for rash.   Neurological:  Negative for dizziness, syncope and headaches.   Hematological:  Negative for adenopathy. Does not bruise/bleed easily.   Psychiatric/Behavioral:  Negative for dysphoric mood. The patient is not nervous/anxious.        Objective   Visit Vitals  /73   Pulse 78   Temp 36.5 °C (97.7 °F)        BP Readings from Last 3 Encounters:   02/13/25 112/73   05/09/24 129/86   03/12/24 107/71     Wt Readings from Last 3 Encounters:   02/13/25 86.8 kg (191 lb 6.4 oz)   05/09/24 86.2 kg (190 lb)   03/12/24 85.7 kg (189 lb)           Physical Exam  Vitals reviewed.   Constitutional:       General: He is not in acute distress.     Appearance: Normal appearance. He is not ill-appearing.   HENT:      Head: Normocephalic.      Right Ear: Tympanic membrane, ear canal and external ear normal.      Left Ear: Tympanic membrane, ear canal and external ear normal.      Nose: Nose normal.      Mouth/Throat:      Mouth: Mucous membranes are moist.      Pharynx: Oropharynx is clear. No oropharyngeal exudate or posterior oropharyngeal erythema.   Eyes:      Extraocular Movements: Extraocular movements intact.      Conjunctiva/sclera: Conjunctivae normal.      Pupils: Pupils are equal, round, and reactive to light.   Cardiovascular:      Rate and Rhythm: Normal rate and regular rhythm.      Heart sounds: Normal heart sounds. No murmur heard.  Pulmonary:      Effort: Pulmonary effort is normal. No respiratory distress.      Breath sounds: Normal breath sounds. No wheezing, rhonchi or rales.   Abdominal:       General: Bowel sounds are normal. There is no distension.      Palpations: Abdomen is soft. There is no mass.      Tenderness: There is no abdominal tenderness.   Musculoskeletal:         General: No swelling or tenderness. Normal range of motion.      Cervical back: Normal range of motion and neck supple. No tenderness.      Right lower leg: No edema.      Left lower leg: No edema.   Lymphadenopathy:      Cervical: No cervical adenopathy.   Skin:     General: Skin is warm.      Findings: No rash.   Neurological:      General: No focal deficit present.      Mental Status: He is alert and oriented to person, place, and time.      Cranial Nerves: No cranial nerve deficit.   Psychiatric:         Mood and Affect: Mood normal.         Behavior: Behavior normal.       Assessment/Plan     1. Routine general medical examination at a health care facility (Primary)    - CBC and Auto Differential  - TSH with reflex to Free T4 if abnormal  - Hemoglobin A1c; Future  - Basic metabolic panel; Future      2. Vitamin D deficiency    - Vitamin D 25-Hydroxy,Total (for eval of Vitamin D levels); Future  - Vitamin D 25-Hydroxy,Total (for eval of Vitamin D levels)    3. Pure hypercholesterolemia      4. BMI 25.0-25.9,adult      5. Medication management    - Hemoglobin A1c; Future  - Hemoglobin A1c

## 2025-02-13 ENCOUNTER — APPOINTMENT (OUTPATIENT)
Dept: PRIMARY CARE | Facility: CLINIC | Age: 40
End: 2025-02-13
Payer: COMMERCIAL

## 2025-02-13 VITALS
HEART RATE: 78 BPM | HEIGHT: 72 IN | DIASTOLIC BLOOD PRESSURE: 73 MMHG | TEMPERATURE: 97.7 F | BODY MASS INDEX: 25.92 KG/M2 | WEIGHT: 191.4 LBS | SYSTOLIC BLOOD PRESSURE: 112 MMHG | OXYGEN SATURATION: 94 %

## 2025-02-13 DIAGNOSIS — Z00.00 ROUTINE GENERAL MEDICAL EXAMINATION AT A HEALTH CARE FACILITY: Primary | ICD-10-CM

## 2025-02-13 DIAGNOSIS — E78.00 PURE HYPERCHOLESTEROLEMIA: ICD-10-CM

## 2025-02-13 DIAGNOSIS — E55.9 VITAMIN D DEFICIENCY: ICD-10-CM

## 2025-02-13 DIAGNOSIS — Z79.899 MEDICATION MANAGEMENT: ICD-10-CM

## 2025-02-13 LAB
NON-UH HIE BASO COUNT: 0.03 X1000 (ref 0–0.2)
NON-UH HIE BASOS %: 0.5 %
NON-UH HIE BUN/CREAT RATIO: 14.5
NON-UH HIE BUN: 16 MG/DL (ref 9–23)
NON-UH HIE CALCIUM: 9.6 MG/DL (ref 8.7–10.4)
NON-UH HIE CALCULATED OSMOLALITY: 282 MOSM/KG (ref 275–295)
NON-UH HIE CHLORIDE: 105 MMOL/L (ref 98–107)
NON-UH HIE CO2, VENOUS: 28 MMOL/L (ref 20–31)
NON-UH HIE CREATININE: 1.1 MG/DL (ref 0.6–1.1)
NON-UH HIE DIFF?: NO
NON-UH HIE EOS COUNT: 0.12 X1000 (ref 0–0.5)
NON-UH HIE EOSIN %: 2.2 %
NON-UH HIE GFR AA: >60
NON-UH HIE GLOMERULAR FILTRATION RATE: >60 ML/MIN/1.73M?
NON-UH HIE GLUCOSE: 89 MG/DL (ref 74–106)
NON-UH HIE HCT: 47.5 % (ref 41–52)
NON-UH HIE HGB A1C: 5.3 %
NON-UH HIE HGB: 16.1 G/DL (ref 13.5–17.5)
NON-UH HIE INSTR WBC: 5.4
NON-UH HIE K: 4.5 MMOL/L (ref 3.5–5.1)
NON-UH HIE LYMPH %: 45.7 %
NON-UH HIE LYMPH COUNT: 2.46 X1000 (ref 1.2–4.8)
NON-UH HIE MCH: 30.3 PG (ref 27–34)
NON-UH HIE MCHC: 33.8 G/DL (ref 32–37)
NON-UH HIE MCV: 89.5 FL (ref 80–100)
NON-UH HIE MONO %: 6 %
NON-UH HIE MONO COUNT: 0.32 X1000 (ref 0.1–1)
NON-UH HIE MPV: 7.6 FL (ref 7.4–10.4)
NON-UH HIE NA: 141 MMOL/L (ref 135–145)
NON-UH HIE NEUTROPHIL %: 45.6 %
NON-UH HIE NEUTROPHIL COUNT (ANC): 2.46 X1000 (ref 1.4–8.8)
NON-UH HIE NUCLEATED RBC: 0 /100WBC
NON-UH HIE PLATELET: 258 X10 (ref 150–450)
NON-UH HIE RBC: 5.31 X10 (ref 4.7–6.1)
NON-UH HIE RDW: 13.5 % (ref 11.5–14.5)
NON-UH HIE TSH: 2.1 UIU/ML (ref 0.55–4.78)
NON-UH HIE VIT D 25: 44 NG/ML
NON-UH HIE WBC: 5.4 X10 (ref 4.5–11)

## 2025-02-13 PROCEDURE — 3008F BODY MASS INDEX DOCD: CPT | Performed by: NURSE PRACTITIONER

## 2025-02-13 PROCEDURE — 1036F TOBACCO NON-USER: CPT | Performed by: NURSE PRACTITIONER

## 2025-02-13 PROCEDURE — 99396 PREV VISIT EST AGE 40-64: CPT | Performed by: NURSE PRACTITIONER

## 2025-02-13 RX ORDER — ALUMINUM ZIRCONIUM OCTACHLOROHYDREX GLY 16 G/100G
GEL TOPICAL
COMMUNITY

## 2025-02-13 ASSESSMENT — PROMIS GLOBAL HEALTH SCALE
RATE_AVERAGE_FATIGUE: MILD
RATE_AVERAGE_PAIN: 2
RATE_PHYSICAL_HEALTH: VERY GOOD
RATE_QUALITY_OF_LIFE: VERY GOOD
RATE_SOCIAL_SATISFACTION: VERY GOOD
CARRYOUT_SOCIAL_ACTIVITIES: EXCELLENT
EMOTIONAL_PROBLEMS: NEVER
RATE_MENTAL_HEALTH: VERY GOOD
CARRYOUT_PHYSICAL_ACTIVITIES: COMPLETELY
RATE_GENERAL_HEALTH: VERY GOOD

## 2025-02-13 ASSESSMENT — PATIENT HEALTH QUESTIONNAIRE - PHQ9
SUM OF ALL RESPONSES TO PHQ9 QUESTIONS 1 AND 2: 0
4. FEELING TIRED OR HAVING LITTLE ENERGY: SEVERAL DAYS
10. IF YOU CHECKED OFF ANY PROBLEMS, HOW DIFFICULT HAVE THESE PROBLEMS MADE IT FOR YOU TO DO YOUR WORK, TAKE CARE OF THINGS AT HOME, OR GET ALONG WITH OTHER PEOPLE: NOT DIFFICULT AT ALL
1. LITTLE INTEREST OR PLEASURE IN DOING THINGS: NOT AT ALL
SUM OF ALL RESPONSES TO PHQ QUESTIONS 1-9: 2
8. MOVING OR SPEAKING SO SLOWLY THAT OTHER PEOPLE COULD HAVE NOTICED. OR THE OPPOSITE, BEING SO FIGETY OR RESTLESS THAT YOU HAVE BEEN MOVING AROUND A LOT MORE THAN USUAL: NOT AT ALL
6. FEELING BAD ABOUT YOURSELF - OR THAT YOU ARE A FAILURE OR HAVE LET YOURSELF OR YOUR FAMILY DOWN: NOT AT ALL
9. THOUGHTS THAT YOU WOULD BE BETTER OFF DEAD, OR OF HURTING YOURSELF: NOT AT ALL
5. POOR APPETITE OR OVEREATING: NOT AT ALL
3. TROUBLE FALLING OR STAYING ASLEEP OR SLEEPING TOO MUCH: SEVERAL DAYS
2. FEELING DOWN, DEPRESSED OR HOPELESS: NOT AT ALL
7. TROUBLE CONCENTRATING ON THINGS, SUCH AS READING THE NEWSPAPER OR WATCHING TELEVISION: NOT AT ALL

## 2025-02-13 ASSESSMENT — ANXIETY QUESTIONNAIRES
4. TROUBLE RELAXING: SEVERAL DAYS
5. BEING SO RESTLESS THAT IT IS HARD TO SIT STILL: NOT AT ALL
2. NOT BEING ABLE TO STOP OR CONTROL WORRYING: NOT AT ALL
1. FEELING NERVOUS, ANXIOUS, OR ON EDGE: NOT AT ALL
7. FEELING AFRAID AS IF SOMETHING AWFUL MIGHT HAPPEN: NOT AT ALL
IF YOU CHECKED OFF ANY PROBLEMS ON THIS QUESTIONNAIRE, HOW DIFFICULT HAVE THESE PROBLEMS MADE IT FOR YOU TO DO YOUR WORK, TAKE CARE OF THINGS AT HOME, OR GET ALONG WITH OTHER PEOPLE: NOT DIFFICULT AT ALL
GAD7 TOTAL SCORE: 1
3. WORRYING TOO MUCH ABOUT DIFFERENT THINGS: NOT AT ALL
6. BECOMING EASILY ANNOYED OR IRRITABLE: NOT AT ALL

## 2025-02-13 NOTE — PATIENT INSTRUCTIONS
Form for work  Psychiatrist wants A1c drawn      Handouts given to pt:  physical handout      Labs- No appt needed:    You can use the lab in our building when fasting. The hrs are: Mon-Fri 7a-330p.  No Saturday hrs. Bring the paper order.   OR   [facility] Mon-Fri 7a-12p. No Saturday hrs. Bring the paper order.  OR   [facility] Mon-Fri 630a-530p or Sat 6:30a-12p. Bring the paper order  OR  [facility] Mon-FrI 7a-5p  [facility] Mon-Fri 730a-4p, Sat  8a-12p  [facility] [address] Mon-Thurs 630a-6p , Fri 630a-4p, Sat 8a-12p  [facility] [address] Mon-Fri 7a-630p and Sat. 7a-3p    Fasting is no food, drink, gum or mints other than water for 12 hrs.   Results will be back in 2-3 business days for most labs. It is always recommended for any orders (labs, xrays, ultrasounds,MRI, ct scan, procedures etc) to check with your insurance provider for expected costs or expenses to you.       You will get your results via phone from my medical assistant if you do not have MyChart.  OR  You will get your results via [employer]    If a result is urgent, I will call to speak to you.    Vaccines:  Up to date    General recommendations:  Exercise-cardio 4-5d/wk 30min each day  Diet-Breakfast-toast (my favorite Christy Morgan Delighful Multigrain or [employer] Killer Bread Good Seed thin-sliced)/bagel/English muffin-whole wheat flour as a 1st ingredient or cereal/oatmeal/granola bar-fiber 4g or more or protein like eggs or peanut butter; optional veggies  Lunch-protein, 1/2c carb or 2 slices bread, veg 1c  Dinner-protein, fist sized carb, veg 1c  Fruit 2 a day  Dairy 2 a day-milk, soy milk, almond milk, cheese, yogurt, cottage cheese  Snacks-Protein-hard boiled egg, nuts (walnuts/almonds/pecans/pistachios 1/4c), hummus, beef/deer jerky or meat sticks; vegetable, fruit, dairy-milk(1%, skim, almond, soy)/cheese (not a lot of cheddar)/yogurt (Greek is best-my favorite Dannon Fruit on the  Bottom Greek)/cottage cheese 2%; triscuits/ popcorn/wheat thins have a lot of fiber; follow serving size on bag/box/container  increase water  Limit alcohol to 1 drink per day for women and 2 drinks per day for men (1 drink=12oz beer or 5oz wine or 1 1/2oz liquor)  Calcium: 500mg 1 twice a day if age 50 and younger and 600mg 1 twice a day if over age 50 (calcium citrate can be taken without food)  Vitamin D: 800-5000 IU/day  Limit salt to <2300mg a day if age 50 and under and <1500mg a day if over age 50/have high bp or diabetes or kidney disease  Recommend folate for childbearing age women 0.4mg per day (can be found in a multivitamin)  Recommend 18mg/dL of iron a day if age 50 and under and 8mg/dL a day if over age 50; take on an empty stomach at bedtime  Use sunscreen   Wear seatbelt  Recommend safe sex practices: using condoms everytime you have sex, discuss with a new partner about their past partners/history of STDs/drug use, avoid drinking alcohol or using drugs as this increases the chance that you will participate in high-risk sex, for oral sex help protect your mouth by having your partner use a condom (male or female), women should not douche after sex, be aware of your partner's body and your body-look for signs of a sore, blister, rash, or discharge, and have regular exams and periodic tests for STDs.  No distracted driving  No driving when under influence of substances  Wear a seatbelt  Eye dr every 1-2yrs  Dentist every 6-12 mon  Tetanus shot every 10yrs  Recommend flu vaccine in the fall  Appt in  1 year for physical      I will communicate with you via [employer] regarding messages and results. If you need help with this, you can call the support line at [phone].    IT WAS A PLEASURE TO SEE YOU TODAY. THANK YOU FOR CHOOSING US FOR YOUR HEALTHCARE NEEDS.

## 2025-06-01 ENCOUNTER — PATIENT MESSAGE (OUTPATIENT)
Dept: PRIMARY CARE | Facility: CLINIC | Age: 40
End: 2025-06-01
Payer: COMMERCIAL

## 2025-06-01 DIAGNOSIS — M54.2 NECK PAIN: Primary | ICD-10-CM

## 2025-06-01 DIAGNOSIS — E78.00 PURE HYPERCHOLESTEROLEMIA: ICD-10-CM

## 2025-06-02 RX ORDER — ROSUVASTATIN CALCIUM 10 MG/1
TABLET, COATED ORAL
Qty: 90 TABLET | Refills: 2 | Status: SHIPPED | OUTPATIENT
Start: 2025-06-02

## 2025-06-02 RX ORDER — ORPHENADRINE CITRATE 100 MG/1
100 TABLET, EXTENDED RELEASE ORAL 2 TIMES DAILY PRN
Qty: 180 TABLET | Refills: 2 | Status: SHIPPED | OUTPATIENT
Start: 2025-06-02 | End: 2025-11-29

## 2026-02-19 ENCOUNTER — APPOINTMENT (OUTPATIENT)
Dept: PRIMARY CARE | Facility: CLINIC | Age: 41
End: 2026-02-19
Payer: COMMERCIAL